# Patient Record
Sex: FEMALE | Race: WHITE | Employment: FULL TIME | URBAN - METROPOLITAN AREA
[De-identification: names, ages, dates, MRNs, and addresses within clinical notes are randomized per-mention and may not be internally consistent; named-entity substitution may affect disease eponyms.]

---

## 2022-03-05 ENCOUNTER — APPOINTMENT (OUTPATIENT)
Dept: GENERAL RADIOLOGY | Age: 57
DRG: 246 | End: 2022-03-05
Attending: STUDENT IN AN ORGANIZED HEALTH CARE EDUCATION/TRAINING PROGRAM
Payer: COMMERCIAL

## 2022-03-05 ENCOUNTER — HOSPITAL ENCOUNTER (INPATIENT)
Age: 57
LOS: 2 days | Discharge: HOME | DRG: 246 | End: 2022-03-07
Attending: STUDENT IN AN ORGANIZED HEALTH CARE EDUCATION/TRAINING PROGRAM | Admitting: STUDENT IN AN ORGANIZED HEALTH CARE EDUCATION/TRAINING PROGRAM
Payer: COMMERCIAL

## 2022-03-05 DIAGNOSIS — I21.3 ST ELEVATION (STEMI) MYOCARDIAL INFARCTION (HCC): ICD-10-CM

## 2022-03-05 DIAGNOSIS — I46.9 CARDIAC ARREST (HCC): ICD-10-CM

## 2022-03-05 DIAGNOSIS — E87.6 HYPOKALEMIA: ICD-10-CM

## 2022-03-05 DIAGNOSIS — I21.02 STEMI INVOLVING LEFT ANTERIOR DESCENDING CORONARY ARTERY (HCC): Primary | ICD-10-CM

## 2022-03-05 DIAGNOSIS — Z72.0 TOBACCO ABUSE: ICD-10-CM

## 2022-03-05 PROBLEM — E66.01 MORBID OBESITY (HCC): Status: ACTIVE | Noted: 2022-03-05

## 2022-03-05 PROBLEM — I47.20 VENTRICULAR TACHYCARDIA: Status: ACTIVE | Noted: 2022-03-05

## 2022-03-05 LAB
ALBUMIN SERPL-MCNC: 3.7 G/DL (ref 3.4–5)
ALBUMIN/GLOB SERPL: 1.1 {RATIO} (ref 0.8–1.7)
ALP SERPL-CCNC: 54 U/L (ref 45–117)
ALT SERPL-CCNC: 33 U/L (ref 13–56)
ANION GAP BLD CALC-SCNC: 14 MMOL/L (ref 10–20)
ANION GAP SERPL CALC-SCNC: 6 MMOL/L (ref 3–18)
APTT PPP: 27.5 SEC (ref 23–36.4)
APTT PPP: 38.7 SEC (ref 23–36.4)
AST SERPL-CCNC: 19 U/L (ref 10–38)
ATRIAL RATE: 64 BPM
BASE DEFICIT BLD-SCNC: 2.5 MMOL/L
BASOPHILS # BLD: 0.1 K/UL (ref 0–0.1)
BASOPHILS NFR BLD: 1 % (ref 0–2)
BILIRUB SERPL-MCNC: 0.4 MG/DL (ref 0.2–1)
BNP SERPL-MCNC: 105 PG/ML (ref 0–900)
BUN SERPL-MCNC: 19 MG/DL (ref 7–18)
BUN/CREAT SERPL: 17 (ref 12–20)
CA-I BLD-MCNC: 1.14 MMOL/L (ref 1.12–1.32)
CALCIUM SERPL-MCNC: 9.4 MG/DL (ref 8.5–10.1)
CALCULATED P AXIS, ECG09: 73 DEGREES
CALCULATED R AXIS, ECG10: 66 DEGREES
CALCULATED T AXIS, ECG11: 39 DEGREES
CHLORIDE BLD-SCNC: 107 MMOL/L (ref 98–107)
CHLORIDE SERPL-SCNC: 109 MMOL/L (ref 100–111)
CHOLEST SERPL-MCNC: 290 MG/DL
CO2 BLD-SCNC: 23 MMOL/L (ref 19–24)
CO2 SERPL-SCNC: 26 MMOL/L (ref 21–32)
CREAT BLD-MCNC: 1.01 MG/DL (ref 0.6–1.3)
CREAT SERPL-MCNC: 1.13 MG/DL (ref 0.6–1.3)
DIAGNOSIS, 93000: NORMAL
DIFFERENTIAL METHOD BLD: ABNORMAL
EOSINOPHIL # BLD: 0.4 K/UL (ref 0–0.4)
EOSINOPHIL NFR BLD: 3 % (ref 0–5)
ERYTHROCYTE [DISTWIDTH] IN BLOOD BY AUTOMATED COUNT: 13.1 % (ref 11.6–14.5)
GLOBULIN SER CALC-MCNC: 3.3 G/DL (ref 2–4)
GLUCOSE BLD-MCNC: 139 MG/DL (ref 65–100)
GLUCOSE SERPL-MCNC: 137 MG/DL (ref 74–99)
HCG SERPL QL: NEGATIVE
HCO3 BLD-SCNC: 22.2 MMOL/L (ref 22–26)
HCT VFR BLD AUTO: 44.8 % (ref 35–45)
HDLC SERPL-MCNC: 35 MG/DL (ref 40–60)
HDLC SERPL: 8.3 {RATIO} (ref 0–5)
HGB BLD-MCNC: 14.8 G/DL (ref 12–16)
IMM GRANULOCYTES # BLD AUTO: 0 K/UL (ref 0–0.04)
IMM GRANULOCYTES NFR BLD AUTO: 0 % (ref 0–0.5)
LACTATE BLD-SCNC: 3.86 MMOL/L (ref 0.4–2)
LDLC SERPL CALC-MCNC: 230.2 MG/DL (ref 0–100)
LIPID PROFILE,FLP: ABNORMAL
LYMPHOCYTES # BLD: 4.1 K/UL (ref 0.9–3.6)
LYMPHOCYTES NFR BLD: 33 % (ref 21–52)
MCH RBC QN AUTO: 32.2 PG (ref 24–34)
MCHC RBC AUTO-ENTMCNC: 33 G/DL (ref 31–37)
MCV RBC AUTO: 97.4 FL (ref 78–100)
MONOCYTES # BLD: 1 K/UL (ref 0.05–1.2)
MONOCYTES NFR BLD: 8 % (ref 3–10)
NEUTS SEG # BLD: 6.9 K/UL (ref 1.8–8)
NEUTS SEG NFR BLD: 55 % (ref 40–73)
NRBC # BLD: 0 K/UL (ref 0–0.01)
NRBC BLD-RTO: 0 PER 100 WBC
P-R INTERVAL, ECG05: 138 MS
PCO2 BLDV: 37.4 MMHG (ref 41–51)
PH BLDV: 7.38 [PH] (ref 7.32–7.42)
PLATELET # BLD AUTO: 316 K/UL (ref 135–420)
PMV BLD AUTO: 10 FL (ref 9.2–11.8)
PO2 BLDV: 26 MMHG (ref 25–40)
POTASSIUM BLD-SCNC: 3.3 MMOL/L (ref 3.5–5.1)
POTASSIUM SERPL-SCNC: 3.4 MMOL/L (ref 3.5–5.5)
PROT SERPL-MCNC: 7 G/DL (ref 6.4–8.2)
Q-T INTERVAL, ECG07: 424 MS
QRS DURATION, ECG06: 84 MS
QTC CALCULATION (BEZET), ECG08: 437 MS
RBC # BLD AUTO: 4.6 M/UL (ref 4.2–5.3)
SAO2 % BLD: 48 %
SERVICE CMNT-IMP: ABNORMAL
SODIUM BLD-SCNC: 143 MMOL/L (ref 136–145)
SODIUM SERPL-SCNC: 141 MMOL/L (ref 136–145)
SPECIMEN SITE: ABNORMAL
TRIGL SERPL-MCNC: 124 MG/DL (ref ?–150)
TROPONIN-HIGH SENSITIVITY: 16 NG/L (ref 0–54)
TROPONIN-HIGH SENSITIVITY: ABNORMAL NG/L (ref 0–54)
VENTRICULAR RATE, ECG03: 64 BPM
VLDLC SERPL CALC-MCNC: 24.8 MG/DL
WBC # BLD AUTO: 12.6 K/UL (ref 4.6–13.2)

## 2022-03-05 PROCEDURE — C1769 GUIDE WIRE: HCPCS | Performed by: INTERNAL MEDICINE

## 2022-03-05 PROCEDURE — 74011250637 HC RX REV CODE- 250/637: Performed by: INTERNAL MEDICINE

## 2022-03-05 PROCEDURE — 99223 1ST HOSP IP/OBS HIGH 75: CPT | Performed by: STUDENT IN AN ORGANIZED HEALTH CARE EDUCATION/TRAINING PROGRAM

## 2022-03-05 PROCEDURE — 99153 MOD SED SAME PHYS/QHP EA: CPT | Performed by: INTERNAL MEDICINE

## 2022-03-05 PROCEDURE — 74011250637 HC RX REV CODE- 250/637: Performed by: STUDENT IN AN ORGANIZED HEALTH CARE EDUCATION/TRAINING PROGRAM

## 2022-03-05 PROCEDURE — 94762 N-INVAS EAR/PLS OXIMTRY CONT: CPT

## 2022-03-05 PROCEDURE — 96374 THER/PROPH/DIAG INJ IV PUSH: CPT

## 2022-03-05 PROCEDURE — 027034Z DILATION OF CORONARY ARTERY, ONE ARTERY WITH DRUG-ELUTING INTRALUMINAL DEVICE, PERCUTANEOUS APPROACH: ICD-10-PCS | Performed by: INTERNAL MEDICINE

## 2022-03-05 PROCEDURE — 80061 LIPID PANEL: CPT

## 2022-03-05 PROCEDURE — 85730 THROMBOPLASTIN TIME PARTIAL: CPT

## 2022-03-05 PROCEDURE — 83880 ASSAY OF NATRIURETIC PEPTIDE: CPT

## 2022-03-05 PROCEDURE — 74011000250 HC RX REV CODE- 250: Performed by: INTERNAL MEDICINE

## 2022-03-05 PROCEDURE — 99223 1ST HOSP IP/OBS HIGH 75: CPT | Performed by: INTERNAL MEDICINE

## 2022-03-05 PROCEDURE — 99285 EMERGENCY DEPT VISIT HI MDM: CPT

## 2022-03-05 PROCEDURE — 93458 L HRT ARTERY/VENTRICLE ANGIO: CPT | Performed by: INTERNAL MEDICINE

## 2022-03-05 PROCEDURE — 82947 ASSAY GLUCOSE BLOOD QUANT: CPT

## 2022-03-05 PROCEDURE — 96375 TX/PRO/DX INJ NEW DRUG ADDON: CPT

## 2022-03-05 PROCEDURE — 85025 COMPLETE CBC W/AUTO DIFF WBC: CPT

## 2022-03-05 PROCEDURE — 80053 COMPREHEN METABOLIC PANEL: CPT

## 2022-03-05 PROCEDURE — 74011250636 HC RX REV CODE- 250/636: Performed by: STUDENT IN AN ORGANIZED HEALTH CARE EDUCATION/TRAINING PROGRAM

## 2022-03-05 PROCEDURE — 71045 X-RAY EXAM CHEST 1 VIEW: CPT

## 2022-03-05 PROCEDURE — C1887 CATHETER, GUIDING: HCPCS | Performed by: INTERNAL MEDICINE

## 2022-03-05 PROCEDURE — 93005 ELECTROCARDIOGRAM TRACING: CPT

## 2022-03-05 PROCEDURE — 65620000000 HC RM CCU GENERAL

## 2022-03-05 PROCEDURE — C1894 INTRO/SHEATH, NON-LASER: HCPCS | Performed by: INTERNAL MEDICINE

## 2022-03-05 PROCEDURE — C1874 STENT, COATED/COV W/DEL SYS: HCPCS | Performed by: INTERNAL MEDICINE

## 2022-03-05 PROCEDURE — 36415 COLL VENOUS BLD VENIPUNCTURE: CPT

## 2022-03-05 PROCEDURE — C1760 CLOSURE DEV, VASC: HCPCS | Performed by: INTERNAL MEDICINE

## 2022-03-05 PROCEDURE — 84703 CHORIONIC GONADOTROPIN ASSAY: CPT

## 2022-03-05 PROCEDURE — C1725 CATH, TRANSLUMIN NON-LASER: HCPCS | Performed by: INTERNAL MEDICINE

## 2022-03-05 PROCEDURE — 85347 COAGULATION TIME ACTIVATED: CPT

## 2022-03-05 PROCEDURE — 77030016699 HC CATH ANGI DX INFN1 CARD -A: Performed by: INTERNAL MEDICINE

## 2022-03-05 PROCEDURE — 77030013519 HC DEV INFL BASIX MRTM -B: Performed by: INTERNAL MEDICINE

## 2022-03-05 PROCEDURE — 99152 MOD SED SAME PHYS/QHP 5/>YRS: CPT | Performed by: INTERNAL MEDICINE

## 2022-03-05 PROCEDURE — 84484 ASSAY OF TROPONIN QUANT: CPT

## 2022-03-05 PROCEDURE — 77030013797 HC KT TRNSDUC PRSSR EDWD -A: Performed by: INTERNAL MEDICINE

## 2022-03-05 PROCEDURE — 74011250636 HC RX REV CODE- 250/636

## 2022-03-05 PROCEDURE — 4A023N7 MEASUREMENT OF CARDIAC SAMPLING AND PRESSURE, LEFT HEART, PERCUTANEOUS APPROACH: ICD-10-PCS | Performed by: INTERNAL MEDICINE

## 2022-03-05 PROCEDURE — 92941 PRQ TRLML REVSC TOT OCCL AMI: CPT | Performed by: INTERNAL MEDICINE

## 2022-03-05 PROCEDURE — 74011000250 HC RX REV CODE- 250: Performed by: STUDENT IN AN ORGANIZED HEALTH CARE EDUCATION/TRAINING PROGRAM

## 2022-03-05 PROCEDURE — B2151ZZ FLUOROSCOPY OF LEFT HEART USING LOW OSMOLAR CONTRAST: ICD-10-PCS | Performed by: INTERNAL MEDICINE

## 2022-03-05 PROCEDURE — 74011250636 HC RX REV CODE- 250/636: Performed by: INTERNAL MEDICINE

## 2022-03-05 DEVICE — XIENCE SIERRA™ EVEROLIMUS ELUTING CORONARY STENT SYSTEM 3.00 MM X 23 MM / RAPID-EXCHANGE
Type: IMPLANTABLE DEVICE | Status: FUNCTIONAL
Brand: XIENCE SIERRA™

## 2022-03-05 RX ORDER — HEPARIN SODIUM 1000 [USP'U]/ML
INJECTION, SOLUTION INTRAVENOUS; SUBCUTANEOUS AS NEEDED
Status: DISCONTINUED | OUTPATIENT
Start: 2022-03-05 | End: 2022-03-05 | Stop reason: HOSPADM

## 2022-03-05 RX ORDER — FENTANYL CITRATE 50 UG/ML
INJECTION, SOLUTION INTRAMUSCULAR; INTRAVENOUS AS NEEDED
Status: DISCONTINUED | OUTPATIENT
Start: 2022-03-05 | End: 2022-03-05 | Stop reason: HOSPADM

## 2022-03-05 RX ORDER — EPTIFIBATIDE 0.75 MG/ML
2 INJECTION, SOLUTION INTRAVENOUS CONTINUOUS
Status: ACTIVE | OUTPATIENT
Start: 2022-03-05 | End: 2022-03-05

## 2022-03-05 RX ORDER — LIDOCAINE HYDROCHLORIDE 10 MG/ML
10 INJECTION, SOLUTION EPIDURAL; INFILTRATION; INTRACAUDAL; PERINEURAL
Status: ACTIVE | OUTPATIENT
Start: 2022-03-05 | End: 2022-03-06

## 2022-03-05 RX ORDER — IBUPROFEN 200 MG
1 TABLET ORAL DAILY
Status: DISCONTINUED | OUTPATIENT
Start: 2022-03-05 | End: 2022-03-07 | Stop reason: HOSPADM

## 2022-03-05 RX ORDER — SODIUM CHLORIDE 0.9 % (FLUSH) 0.9 %
5-40 SYRINGE (ML) INJECTION EVERY 8 HOURS
Status: DISCONTINUED | OUTPATIENT
Start: 2022-03-05 | End: 2022-03-07 | Stop reason: HOSPADM

## 2022-03-05 RX ORDER — ONDANSETRON 2 MG/ML
4 INJECTION INTRAMUSCULAR; INTRAVENOUS
Status: ACTIVE | OUTPATIENT
Start: 2022-03-05 | End: 2022-03-06

## 2022-03-05 RX ORDER — HEPARIN SODIUM 1000 [USP'U]/ML
4000 INJECTION, SOLUTION INTRAVENOUS; SUBCUTANEOUS ONCE
Status: COMPLETED | OUTPATIENT
Start: 2022-03-05 | End: 2022-03-05

## 2022-03-05 RX ORDER — CALCIUM GLUCONATE 20 MG/ML
1 INJECTION, SOLUTION INTRAVENOUS
Status: DISCONTINUED | OUTPATIENT
Start: 2022-03-05 | End: 2022-03-05

## 2022-03-05 RX ORDER — MAGNESIUM SULFATE HEPTAHYDRATE 40 MG/ML
2 INJECTION, SOLUTION INTRAVENOUS ONCE
Status: DISCONTINUED | OUTPATIENT
Start: 2022-03-05 | End: 2022-03-05

## 2022-03-05 RX ORDER — HEPARIN SODIUM 5000 [USP'U]/ML
5000 INJECTION, SOLUTION INTRAVENOUS; SUBCUTANEOUS EVERY 8 HOURS
Status: DISCONTINUED | OUTPATIENT
Start: 2022-03-05 | End: 2022-03-05 | Stop reason: SDUPTHER

## 2022-03-05 RX ORDER — ATORVASTATIN CALCIUM 40 MG/1
80 TABLET, FILM COATED ORAL
Status: DISCONTINUED | OUTPATIENT
Start: 2022-03-05 | End: 2022-03-07 | Stop reason: HOSPADM

## 2022-03-05 RX ORDER — CARVEDILOL 3.12 MG/1
3.12 TABLET ORAL 2 TIMES DAILY WITH MEALS
Status: DISCONTINUED | OUTPATIENT
Start: 2022-03-05 | End: 2022-03-07 | Stop reason: HOSPADM

## 2022-03-05 RX ORDER — TEMAZEPAM 15 MG/1
15 CAPSULE ORAL
Status: ACTIVE | OUTPATIENT
Start: 2022-03-05 | End: 2022-03-06

## 2022-03-05 RX ORDER — LISINOPRIL 5 MG/1
5 TABLET ORAL DAILY
Status: DISCONTINUED | OUTPATIENT
Start: 2022-03-06 | End: 2022-03-07 | Stop reason: HOSPADM

## 2022-03-05 RX ORDER — EPTIFIBATIDE 0.75 MG/ML
INJECTION, SOLUTION INTRAVENOUS
Status: COMPLETED | OUTPATIENT
Start: 2022-03-05 | End: 2022-03-05

## 2022-03-05 RX ORDER — HEPARIN SODIUM 1000 [USP'U]/ML
1000 INJECTION, SOLUTION INTRAVENOUS; SUBCUTANEOUS ONCE
Status: COMPLETED | OUTPATIENT
Start: 2022-03-05 | End: 2022-03-05

## 2022-03-05 RX ORDER — SODIUM CHLORIDE 0.9 % (FLUSH) 0.9 %
5-40 SYRINGE (ML) INJECTION AS NEEDED
Status: DISCONTINUED | OUTPATIENT
Start: 2022-03-05 | End: 2022-03-07 | Stop reason: HOSPADM

## 2022-03-05 RX ORDER — NITROGLYCERIN 0.4 MG/1
0.4 TABLET SUBLINGUAL
Status: DISCONTINUED | OUTPATIENT
Start: 2022-03-05 | End: 2022-03-07 | Stop reason: HOSPADM

## 2022-03-05 RX ORDER — FENTANYL CITRATE 50 UG/ML
50 INJECTION, SOLUTION INTRAMUSCULAR; INTRAVENOUS
Status: COMPLETED | OUTPATIENT
Start: 2022-03-05 | End: 2022-03-05

## 2022-03-05 RX ORDER — SODIUM CHLORIDE 9 MG/ML
INJECTION, SOLUTION INTRAVENOUS
Status: COMPLETED | OUTPATIENT
Start: 2022-03-05 | End: 2022-03-05

## 2022-03-05 RX ORDER — POTASSIUM CHLORIDE 20 MEQ/1
40 TABLET, EXTENDED RELEASE ORAL EVERY 4 HOURS
Status: DISCONTINUED | OUTPATIENT
Start: 2022-03-05 | End: 2022-03-05 | Stop reason: CLARIF

## 2022-03-05 RX ORDER — ADHESIVE BANDAGE
30 BANDAGE TOPICAL DAILY PRN
Status: DISCONTINUED | OUTPATIENT
Start: 2022-03-05 | End: 2022-03-07 | Stop reason: HOSPADM

## 2022-03-05 RX ORDER — GUAIFENESIN 100 MG/5ML
81 LIQUID (ML) ORAL DAILY
Status: DISCONTINUED | OUTPATIENT
Start: 2022-03-06 | End: 2022-03-07 | Stop reason: HOSPADM

## 2022-03-05 RX ORDER — ONDANSETRON 2 MG/ML
INJECTION INTRAMUSCULAR; INTRAVENOUS AS NEEDED
Status: DISCONTINUED | OUTPATIENT
Start: 2022-03-05 | End: 2022-03-05 | Stop reason: HOSPADM

## 2022-03-05 RX ORDER — POTASSIUM CHLORIDE 7.45 MG/ML
10 INJECTION INTRAVENOUS
Status: DISCONTINUED | OUTPATIENT
Start: 2022-03-05 | End: 2022-03-05 | Stop reason: CLARIF

## 2022-03-05 RX ORDER — LIDOCAINE HYDROCHLORIDE 10 MG/ML
INJECTION, SOLUTION EPIDURAL; INFILTRATION; INTRACAUDAL; PERINEURAL AS NEEDED
Status: DISCONTINUED | OUTPATIENT
Start: 2022-03-05 | End: 2022-03-05 | Stop reason: HOSPADM

## 2022-03-05 RX ORDER — ATROPINE SULFATE 1 MG/ML
0.5 INJECTION, SOLUTION INTRAVENOUS AS NEEDED
Status: DISCONTINUED | OUTPATIENT
Start: 2022-03-05 | End: 2022-03-07 | Stop reason: HOSPADM

## 2022-03-05 RX ORDER — MIDAZOLAM HYDROCHLORIDE 1 MG/ML
INJECTION, SOLUTION INTRAMUSCULAR; INTRAVENOUS AS NEEDED
Status: DISCONTINUED | OUTPATIENT
Start: 2022-03-05 | End: 2022-03-05 | Stop reason: HOSPADM

## 2022-03-05 RX ORDER — EPTIFIBATIDE 2 MG/ML
INJECTION, SOLUTION INTRAVENOUS AS NEEDED
Status: DISCONTINUED | OUTPATIENT
Start: 2022-03-05 | End: 2022-03-05 | Stop reason: HOSPADM

## 2022-03-05 RX ORDER — HEPARIN SODIUM 1000 [USP'U]/ML
INJECTION, SOLUTION INTRAVENOUS; SUBCUTANEOUS
Status: COMPLETED
Start: 2022-03-05 | End: 2022-03-05

## 2022-03-05 RX ORDER — DOCUSATE SODIUM 100 MG/1
100 CAPSULE, LIQUID FILLED ORAL 2 TIMES DAILY
Status: DISCONTINUED | OUTPATIENT
Start: 2022-03-05 | End: 2022-03-07 | Stop reason: HOSPADM

## 2022-03-05 RX ORDER — ATROPINE SULFATE 1 MG/ML
0.6 INJECTION, SOLUTION INTRAVENOUS
Status: ACTIVE | OUTPATIENT
Start: 2022-03-05 | End: 2022-03-06

## 2022-03-05 RX ORDER — HEPARIN SODIUM 10000 [USP'U]/100ML
9-25 INJECTION, SOLUTION INTRAVENOUS
Status: DISCONTINUED | OUTPATIENT
Start: 2022-03-05 | End: 2022-03-05

## 2022-03-05 RX ORDER — NITROGLYCERIN 40 MG/100ML
INJECTION INTRAVENOUS
Status: COMPLETED | OUTPATIENT
Start: 2022-03-05 | End: 2022-03-05

## 2022-03-05 RX ORDER — POTASSIUM CHLORIDE 20 MEQ/1
40 TABLET, EXTENDED RELEASE ORAL EVERY 4 HOURS
Status: COMPLETED | OUTPATIENT
Start: 2022-03-05 | End: 2022-03-06

## 2022-03-05 RX ORDER — OXYCODONE AND ACETAMINOPHEN 5; 325 MG/1; MG/1
1-2 TABLET ORAL
Status: DISPENSED | OUTPATIENT
Start: 2022-03-05 | End: 2022-03-06

## 2022-03-05 RX ORDER — ACETAMINOPHEN 325 MG/1
650 TABLET ORAL
Status: DISCONTINUED | OUTPATIENT
Start: 2022-03-05 | End: 2022-03-07 | Stop reason: HOSPADM

## 2022-03-05 RX ORDER — SODIUM CHLORIDE 9 MG/ML
50 INJECTION, SOLUTION INTRAVENOUS CONTINUOUS
Status: DISPENSED | OUTPATIENT
Start: 2022-03-05 | End: 2022-03-06

## 2022-03-05 RX ADMIN — HEPARIN SODIUM 4000 UNITS: 1000 INJECTION INTRAVENOUS; SUBCUTANEOUS at 14:40

## 2022-03-05 RX ADMIN — SODIUM CHLORIDE, PRESERVATIVE FREE 10 ML: 5 INJECTION INTRAVENOUS at 15:41

## 2022-03-05 RX ADMIN — ACETAMINOPHEN 650 MG: 325 TABLET ORAL at 19:42

## 2022-03-05 RX ADMIN — POTASSIUM CHLORIDE 40 MEQ: 1500 TABLET, EXTENDED RELEASE ORAL at 20:18

## 2022-03-05 RX ADMIN — HEPARIN SODIUM 9 UNITS/KG/HR: 1000 INJECTION INTRAVENOUS; SUBCUTANEOUS at 15:02

## 2022-03-05 RX ADMIN — FENTANYL CITRATE 50 MCG: 50 INJECTION, SOLUTION INTRAMUSCULAR; INTRAVENOUS at 14:50

## 2022-03-05 RX ADMIN — ATORVASTATIN CALCIUM 80 MG: 40 TABLET, FILM COATED ORAL at 21:33

## 2022-03-05 RX ADMIN — OXYCODONE AND ACETAMINOPHEN 1 TABLET: 5; 325 TABLET ORAL at 21:36

## 2022-03-05 RX ADMIN — SODIUM CHLORIDE, PRESERVATIVE FREE 10 ML: 5 INJECTION INTRAVENOUS at 21:34

## 2022-03-05 RX ADMIN — SODIUM CHLORIDE 50 ML/HR: 9 INJECTION, SOLUTION INTRAVENOUS at 17:35

## 2022-03-05 RX ADMIN — EPTIFIBATIDE 2 MCG/KG/MIN: 0.75 INJECTION INTRAVENOUS at 16:56

## 2022-03-05 RX ADMIN — TICAGRELOR 180 MG: 90 TABLET ORAL at 14:55

## 2022-03-05 RX ADMIN — HEPARIN SODIUM 1000 UNITS: 1000 INJECTION INTRAVENOUS; SUBCUTANEOUS at 14:55

## 2022-03-05 RX ADMIN — HEPARIN SODIUM 4000 UNITS: 1000 INJECTION, SOLUTION INTRAVENOUS; SUBCUTANEOUS at 14:40

## 2022-03-05 NOTE — CONSULTS
Cardiology Consult Note    Consultation request by No admitting provider for patient encounter. for advice/opinion related to evaluating possible STEMI    Date of  Admission: 3/5/2022  2:38 PM   Primary Care Physician:  None       Assessment:        Active Hospital Problems    Diagnosis Date Noted    STEMI (ST elevation myocardial infarction) ([facility] [address] 03/05/2022     Priority: 1 - One    Ventricular tachycardia ([facility] [address] 03/05/2022    Cardiac arrest ([facility] [address] 03/05/2022    Tobacco abuse 03/05/2022    Morbid obesity ([facility] [address] 03/05/2022         1. Likely acute anterolateral MI   2. ongoing tobacco abuse  3. Hyperlipidemia  4. Morbid obesity       Primary cardiologist: None. She has not visited her old cardiologist in many years and does not take aspirin regularly. Plan:     . emergency cardiac catheterization and possible coronary intervention. Procedure discussed with patient risks including MI stroke death hematoma bleeding etc.  She is willing to proceed    Further plans depending on the findings of the catheterization. Labs are still pending but have been drawn. History of Present Illness: This is a 49 y.o. female admitted for No admission diagnoses are documented for this encounter. .    Patient complains of: Severe chest pain. She was raking leaves in her yard when she suddenly collapsed. Paramedics called. Initially, she had normal rhythm but soon she developed V. tach requiring cardioversion. Transported to emergency room. Aspirin given in route and heparin were given in the ER. I discussed with the ER attending Dr. Alyson Hicks. Initial EKG was diffuse J-point elevation and a repeat EKG showed worsening of J-point elevation consistent with possible acute ST elevation MI with hyperacute ST-T changes. She agreed for a cardiac catheterization after I described it in the ER. She denied any bleeding problems. No chronic liver lung or renal disease. No severe skin lesions or chronic headaches.   No seizure or stroke in the past.  No recent surgeries. Cardiac risk factors: smoking/ tobacco exposure, dyslipidemia, obesity, previous CAD and MI      Review of Symptoms:  Except as stated above include:  Constitutional:  negative  Respiratory:  negative  Cardiovascular:  negative other than what is above  Gastrointestinal: negative  Genitourinary:  negative  Musculoskeletal:  Negative  Neurological:  Negative  Dermatological:  Negative  Endocrinological: Negative  Psychological:  Negative         Past Medical History:   No past medical history on file. As above     Social History:     Social History     Socioeconomic History    Marital status:    Continues to smoke     Family History:   No family history on file. Medications:   No Known Allergies     Current Facility-Administered Medications   Medication Dose Route Frequency    heparin 25,000 units in  ml infusion  9-25 Units/kg/hr IntraVENous TITRATE     No current outpatient medications on file. Physical Exam:     Visit Vitals  BP 97/63   Pulse (!) 53   Resp 19   Ht 5' 4\" (1.626 m)   Wt 108.9 kg (240 lb)   SpO2 100%   BMI 41.20 kg/m²       TELE: normal sinus rhythm    BP Readings from Last 3 Encounters:   03/05/22 97/63     Pulse Readings from Last 3 Encounters:   03/05/22 (!) 53     Wt Readings from Last 3 Encounters:   03/05/22 108.9 kg (240 lb)       General:  alert, cooperative, severe distress, appears stated age, morbidly obese  Neck:  no carotid bruit, no JVD  Lungs:  clear to auscultation bilaterally  Heart:  regular rate and rhythm, S1, S2 normal, no murmur, click, rub or gallop  Abdomen:  abdomen is soft without significant tenderness, masses, organomegaly or guarding  Extremities:  extremities normal, atraumatic, no cyanosis or edema  Skin: Warm and dry.  no hyperpigmentation, vitiligo, or suspicious lesions  Neuro: alert, oriented x3, affect appropriate, no focal neurological deficits, moves all extremities well, no involuntary movements  Psych: non focal     Data Review:     Recent Labs     03/05/22  1435   WBC 12.6   HGB 14.8   HCT 44.8        Recent Labs     03/05/22  1435      K 3.4*      CO2 26   *   BUN 19*   CREA 1.13   CA 9.4   ALB 3.7   ALT 33       Results for orders placed or performed during the hospital encounter of 03/05/22   EKG, 12 LEAD, INITIAL   Result Value Ref Range    Ventricular Rate 64 BPM    Atrial Rate 64 BPM    P-R Interval 138 ms    QRS Duration 84 ms    Q-T Interval 424 ms    QTC Calculation (Bezet) 437 ms    Calculated P Axis 73 degrees    Calculated R Axis 66 degrees    Calculated T Axis 39 degrees    Diagnosis       Normal sinus rhythm  Low voltage QRS  Septal infarct , age undetermined  Possible Lateral infarct , age undetermined  Abnormal ECG  No previous ECGs available         All Cardiac Markers in the last 24 hours:  No results found for: CPK, CK, CKMMB, CKMB, RCK3, CKMBT, CKNDX, CKND1, ROLANDA, TROPT, TROIQ, ALLY, TROPT, TNIPOC, BNP, BNPP    Last Lipid:  No results found for: CHOL, CHOLX, CHLST, CHOLV, HDL, HDLP, LDL, LDLC, DLDLP, TGLX, TRIGL, TRIGP, CHHD, CHHDX    Cardiographics:     EKG Results     Procedure 720 Value Units Date/Time    EKG, 12 LEAD, INITIAL [[id number]] Collected: 03/05/22 1435    Order Status: Completed Updated: 03/05/22 1436     Ventricular Rate 64 BPM      Atrial Rate 64 BPM      P-R Interval 138 ms      QRS Duration 84 ms      Q-T Interval 424 ms      QTC Calculation (Bezet) 437 ms      Calculated P Axis 73 degrees      Calculated R Axis 66 degrees      Calculated T Axis 39 degrees      Diagnosis --     Normal sinus rhythm  Low voltage QRS  Septal infarct , age undetermined  Possible Lateral infarct , age undetermined  Abnormal ECG  No previous ECGs available                    XR Results (most recent):  No results found for this or any previous visit.         Signed By: Fritz Cushing, MD     March 5, 2022

## 2022-03-05 NOTE — ED NOTES
Pt taken to cath lab escorted by tech and RN. Report given to all staff in cath lab at time of drop off. All pt belongings including purse and ID given to .  place in cath lab waiting area.

## 2022-03-05 NOTE — H&P
History & Physical    Patient: Lou Roland MRN: [id number]  CSN: [id number]    YOB: 1965  Age: 49 y.o. Sex: female      DOA: 3/5/2022    Chief Complaint: No chief complaint on file. HPI:     Lou Roland is a 49 y.o. female w/ PMH CAD status post PCI with LAD stent placement, hypertension, hyperlipidemia, obesity who presented to the ED with complaints of chest pain initially. She had a V. Tach arrest for which she received CPR and defibrillation X1. According to EMS, patient may or may not have had a syncopal event and was found to be pulseless. She had ROSC and was alert and oriented x4 after this. Initially requiring higher amounts of O2 but now. She was apparently raking leaves prior to the onset of the chest pain and was having complaints of clamminess while doing this. In the ED, patient had ST elevations in the inferolateral leads. STEMI alert was called and she was taken emergently to the Cath Lab. She was loaded with aspirin and Brilinta at the time. Patient is status post in-stent thrombosis found with PCI in the same area. Currently, patient feels significantly better with resolution of her chest pain. States that she stopped taking her plavix and intermittently takes aspirin. Denies any n/v/d/c, dysuria, dizziness/lightheadedness, HA, changes in vision, focal motor weakness, rashes, joint pains.     Past Medical History:   Diagnosis Date    CAD in native artery     Hyperlipidemia     Hypertension     Obesity, Class III, BMI 40-49.9 (morbid obesity) (HCC)        Past Surgical History:   Procedure Laterality Date    HX CORONARY STENT PLACEMENT         Family History   Problem Relation Age of Onset    Esophageal Cancer Mother     Breast Cancer Father        Social History     Socioeconomic History    Marital status:        Prior to Admission medications    Not on File       No Known Allergies      Review of Systems  GENERAL: No weight loss, no falls. HEENT: No change in vision, no earache, no tinnitus, no sore throat or sinus congestion. NECK: No pain or stiffness. PULMONARY: No shortness of breath, no cough or wheeze. Cardiovascular: See HPI  GASTROINTESTINAL: No abdominal pain, no nausea, no vomiting or diarrhea, no melena or bright red blood per rectum. GENITOURINARY: No urinary frequency, no urgency, no hesitancy or dysuria. MUSCULOSKELETAL: No joint or muscle pain, no back pain, no recent trauma. DERMATOLOGIC: No rash, no itching, no lesions. ENDOCRINE: No polyuria, no polydipsia, no heat or cold intolerance. No recent change in weight. HEMATOLOGICAL: No anemia or easy bruising or bleeding. NEUROLOGIC: No headache, no seizures, no numbness, no tingling or weakness. Physical Exam:     Physical Exam:  Visit Vitals  BP 97/63   Pulse (!) 53   Resp 19   Ht 5' 4\" (1.626 m)   Wt 108.9 kg (240 lb) Comment: EST   SpO2 100%   BMI 41.20 kg/m²           No data recorded. No intake/output data recorded. No intake/output data recorded. General: AOX3, NAD. Obese. HEENT: NCAT, no scleral icterus, PERRL  Neck: No LAD, no JVD  Lungs: CTAB, no wheezing, rales, or crackles. In no respiratory distress. CV: RRR, S1/S2 normal. No MRG. Abdomen: Soft, NT, ND. Normoactive bowel sounds. Extremities: No cyanosis or edema. Skin: No rashes or lesions  Neuro: Aox3, no focal motor or sensory deficits    Labs Reviewed: All lab results for the last 24 hours reviewed.   CXR and EKG    Procedures/imaging: see electronic medical records for all procedures/Xrays and details which were not copied into this note but were reviewed prior to creation of Plan      Assessment/Plan     Active Problems:    STEMI (ST elevation myocardial infarction) ([facility] [address] (3/5/2022)       Problem:  · STEMI w in stent occlusion in LAD, s/p PCI  · Obesity  · HTN  · HLD  · Ongoing tobacco use    Plan:  · Admit to CVICU for post STEMI monitoring  · Start Brilinta and aspirin - was noncompliant with aspirin therapy. · starting low-dose Coreg given her previous bradycardia. Can titrate up as tolerated to goal of 25 twice daily  · Start nicotine patch - recommend discharging with Chantix  · Start lisinopril and atorvastatin 80  · Follow-up pregnancy test  · Follow-up HbA1c, TSH, and lipid panel  · Follow-up echo  · Follow further cardiology recs  · Orders placed for cardiac rehab  · Recommend weight loss. Nutrition consult. DVT/GI Prophylaxis: SCDs    Discussed with patient at bedside about hospital admission and my plan care, they understood and agree with my plan care. Moreno England MD  3/5/2022 4:12 PM    Disclaimer: Sections of this note are dictated using utilizing voice recognition software. Minor typographical errors may be present. If questions arise, please do not hesitate to contact me or call our department.

## 2022-03-05 NOTE — ED PROVIDER NOTES
EMERGENCY DEPARTMENT HISTORY AND PHYSICAL EXAM      Date: 3/5/2022  Patient Name: Anamika Clifford    History of Presenting Illness     No chief complaint on file. History (Context): Carleen Chiu is a 49 y.o. female with a past medical history significant for CAD status post LAD placement, hypertension, hyperlipidemia comes into the ED today due to cardiac arrest.  Per EMS patient initially called 911 for chest pain. Upon arrival patient may or may not have had a syncopal episode however while obtaining vitals patient went into V. tach and was found to be pulseless. Patient had CPR performed and defibrillation performed. Patient with return of spontaneous circulation following. Patient admits to chest pain upon presentation here to the emergency department. She states pain began approximately 1 hour prior to arrival here in the emergency department. She denies taking medication for treatment of her symptoms. Patient denied any alleviating or exacerbating factors. She did state prior to chest pain onset she was raking leaves and had associated clamminess. She otherwise denies any abdominal pain, nausea, or vomiting. Patient describes her symptoms as severe in quality.       PCP: None    Current Facility-Administered Medications   Medication Dose Route Frequency Provider Last Rate Last Admin    heparin 25,000 units in  ml infusion  9-25 Units/kg/hr IntraVENous TITRATE Genevieve Norris DO   Stopped at 03/05/22 1532    midazolam (VERSED) injection    PRN Micah Cantor MD   1 mg at 03/05/22 1523    fentaNYL citrate (PF) injection    PRN Micah Cantor MD   25 mcg at 03/05/22 1528    lidocaine (PF) (XYLOCAINE) 10 mg/mL (1 %) injection    PRN Micah Cantor MD   10 mL at 03/05/22 1524    heparin (porcine) 1,000 unit/mL injection    PRIVY Cantor MD   4,000 Units at 03/05/22 1532    nitroglycerin (TRIDIL) 400 mcg/ml infusion    CONTINUOUS Micah Cantor MD 1.5 mL/hr at 03/05/22 1536 10 mcg/min at 03/05/22 1536       Past History     Past Medical History:   CAD, HTN, HLD    Past Surgical History:  No past surgical history on file. Family History:  No family history on file. Social History:   Social History     Tobacco Use    Smoking status: Not on file    Smokeless tobacco: Not on file   Substance Use Topics    Alcohol use: Not on file    Drug use: Not on file     Unknown due to acuity    Allergies:  No Known Allergies    PMH, PSH, family history, social history, allergies reviewed with the patient with significant items noted above. Review of Systems   Review of Systems   Constitutional: Negative for chills, diaphoresis and fever. Clammy     HENT: Negative for sore throat. Eyes: Negative for visual disturbance. Respiratory: Negative for shortness of breath. Cardiovascular: Positive for chest pain. Gastrointestinal: Negative for abdominal pain, nausea and vomiting. Genitourinary: Negative for difficulty urinating. Musculoskeletal: Negative for myalgias. Skin: Positive for pallor. Negative for rash. Neurological: Positive for syncope. Negative for headaches. Physical Exam     Vitals:    03/05/22 1443 03/05/22 1453 03/05/22 1458 03/05/22 1503   BP: 108/72 (!) 103/56 97/63    Pulse: 64 (!) 57 (!) 59 (!) 53   Resp: 19 15 18 19   SpO2: 99% 100% 100% 100%   Weight:       Height:           Physical Exam  Vitals and nursing note reviewed. Constitutional:       General: She is in acute distress. Appearance: Normal appearance. She is not ill-appearing or toxic-appearing. HENT:      Head: Normocephalic and atraumatic. Mouth/Throat:      Mouth: Mucous membranes are moist.   Eyes:      General: No scleral icterus. Conjunctiva/sclera: Conjunctivae normal.   Cardiovascular:      Rate and Rhythm: Normal rate and regular rhythm. Pulses: Normal pulses.       Comments: Normal peripheral perfusion  Pulmonary:      Effort: Pulmonary effort is normal. No respiratory distress. Abdominal:      General: There is no distension. Palpations: Abdomen is soft. Tenderness: There is no abdominal tenderness. Musculoskeletal:         General: No deformity. Normal range of motion. Cervical back: Normal range of motion and neck supple. Skin:     General: Skin is warm and dry. Findings: No rash. Neurological:      General: No focal deficit present. Mental Status: She is alert and oriented to person, place, and time. Mental status is at baseline. Psychiatric:         Mood and Affect: Mood normal.         Thought Content: Thought content normal.         Diagnostic Study Results     Labs -     Recent Results (from the past 12 hour(s))   CBC WITH AUTOMATED DIFF    Collection Time: 03/05/22  2:35 PM   Result Value Ref Range    WBC 12.6 4.6 - 13.2 K/uL    RBC 4.60 4.20 - 5.30 M/uL    HGB 14.8 12.0 - 16.0 g/dL    HCT 44.8 35.0 - 45.0 %    MCV 97.4 78.0 - 100.0 FL    MCH 32.2 24.0 - 34.0 PG    MCHC 33.0 31.0 - 37.0 g/dL    RDW 13.1 11.6 - 14.5 %    PLATELET 448 031 - 918 K/uL    MPV 10.0 9.2 - 11.8 FL    NRBC 0.0 0  WBC    ABSOLUTE NRBC 0.00 0.00 - 0.01 K/uL    NEUTROPHILS 55 40 - 73 %    LYMPHOCYTES 33 21 - 52 %    MONOCYTES 8 3 - 10 %    EOSINOPHILS 3 0 - 5 %    BASOPHILS 1 0 - 2 %    IMMATURE GRANULOCYTES 0 0.0 - 0.5 %    ABS. NEUTROPHILS 6.9 1.8 - 8.0 K/UL    ABS. LYMPHOCYTES 4.1 (H) 0.9 - 3.6 K/UL    ABS. MONOCYTES 1.0 0.05 - 1.2 K/UL    ABS. EOSINOPHILS 0.4 0.0 - 0.4 K/UL    ABS. BASOPHILS 0.1 0.0 - 0.1 K/UL    ABS. IMM.  GRANS. 0.0 0.00 - 0.04 K/UL    DF AUTOMATED     METABOLIC PANEL, COMPREHENSIVE    Collection Time: 03/05/22  2:35 PM   Result Value Ref Range    Sodium 141 136 - 145 mmol/L    Potassium 3.4 (L) 3.5 - 5.5 mmol/L    Chloride 109 100 - 111 mmol/L    CO2 26 21 - 32 mmol/L    Anion gap 6 3.0 - 18 mmol/L    Glucose 137 (H) 74 - 99 mg/dL    BUN 19 (H) 7.0 - 18 MG/DL    Creatinine 1.13 0.6 - 1.3 MG/DL BUN/Creatinine ratio 17 12 - 20      GFR est AA >60 >60 ml/min/1.73m2    GFR est non-AA 50 (L) >60 ml/min/1.73m2    Calcium 9.4 8.5 - 10.1 MG/DL    Bilirubin, total 0.4 0.2 - 1.0 MG/DL    ALT (SGPT) 33 13 - 56 U/L    AST (SGOT) 19 10 - 38 U/L    Alk.  phosphatase 54 45 - 117 U/L    Protein, total 7.0 6.4 - 8.2 g/dL    Albumin 3.7 3.4 - 5.0 g/dL    Globulin 3.3 2.0 - 4.0 g/dL    A-G Ratio 1.1 0.8 - 1.7     NT-PRO BNP    Collection Time: 03/05/22  2:35 PM   Result Value Ref Range    NT pro- 0 - 900 PG/ML   PTT    Collection Time: 03/05/22  2:35 PM   Result Value Ref Range    aPTT 27.5 23.0 - 36.4 SEC   EKG, 12 LEAD, INITIAL    Collection Time: 03/05/22  2:35 PM   Result Value Ref Range    Ventricular Rate 64 BPM    Atrial Rate 64 BPM    P-R Interval 138 ms    QRS Duration 84 ms    Q-T Interval 424 ms    QTC Calculation (Bezet) 437 ms    Calculated P Axis 73 degrees    Calculated R Axis 66 degrees    Calculated T Axis 39 degrees    Diagnosis       Normal sinus rhythm  Low voltage QRS  Septal infarct , age undetermined  Possible Lateral infarct , age undetermined  Abnormal ECG  No previous ECGs available     BLOOD GAS,CHEM8,LACTIC ACID POC    Collection Time: 03/05/22  2:39 PM   Result Value Ref Range    Calcium, ionized (POC) 1.14 1.12 - 1.32 mmol/L    Base deficit (POC) 2.5 mmol/L    HCO3 (POC) 22.2 22 - 26 MMOL/L    CO2, POC 23 19 - 24 MMOL/L    O2 SAT 48 %    Sample source VENOUS BLOOD      Performed by Nasrin Khan     Sodium (POC) 143 136 - 145 mmol/L    Potassium (POC) 3.3 (L) 3.5 - 5.1 mmol/L    Glucose (POC) 139 (H) 65 - 100 mg/dL    Creatinine (POC) 1.01 0.6 - 1.3 mg/dL    Lactic Acid (POC) 3.86 (HH) 0.40 - 2.00 mmol/L    Chloride (POC) 107 98 - 107 mmol/L    Anion gap, POC 14 10 - 20      GFRAA, POC >60 >60 ml/min/1.73m2    GFRNA, POC 57 (L) >60 ml/min/1.73m2    pH, venous (POC) 7.38 7.32 - 7.42      pCO2, venous (POC) 37.4 (L) 41 - 51 MMHG    pO2, venous (POC) 26 25 - 40 mmHg      Labs Reviewed   CBC WITH AUTOMATED DIFF - Abnormal; Notable for the following components:       Result Value    ABS. LYMPHOCYTES 4.1 (*)     All other components within normal limits   METABOLIC PANEL, COMPREHENSIVE - Abnormal; Notable for the following components:    Potassium 3.4 (*)     Glucose 137 (*)     BUN 19 (*)     GFR est non-AA 50 (*)     All other components within normal limits   BLOOD GAS,CHEM8,LACTIC ACID POC - Abnormal; Notable for the following components:    Potassium (POC) 3.3 (*)     Glucose (POC) 139 (*)     Lactic Acid (POC) 3.86 (*)     GFRNA, POC 57 (*)     pCO2, venous (POC) 37.4 (*)     All other components within normal limits   TROPONIN-HIGH SENSITIVITY   NT-PRO BNP   PTT   PTT       Radiologic Studies -   XR CHEST PORT    (Results Pending)     CT Results  (Last 48 hours)    None        CXR Results  (Last 48 hours)    None          The laboratory results, imaging results, and other diagnostic exams were reviewed in the EMR. Medical Decision Making   I am the first provider for this patient. I reviewed the vital signs, available nursing notes, past medical history, past surgical history, family history and social history. Vital Signs-Reviewed the patient's vital signs. ED EKG interpretation:  Rhythm: normal sinus rhythm; and regular . Rate (approx.): 64; Axis: normal; P wave: normal; QRS interval: normal ; ST/T wave: elevated ; Other findings: ischemia . This EKG was interpreted by Sherryle Donalds, D.O. Records Reviewed: Personally, on initial evaluation    MDM:   Marcos Valle presents with complaint of cardiac arrest  DDX includes but is not limited to: MI, ACS, PE    Patient overall in acute distress however vital signs grossly within normal limits. Initial EKG shows J-point elevation however more consistent with early repolarization than STEMI. However due to patient's significant history as well as cardiac arrest concern for MI at this time despite no overt ST elevations.   Will provide patient with heparin while here in the emergency department. Patient received aspirin prior to arrival.  Will obtain lab work and imaging for further evaluation of patients complaint. Will continue to monitor and evaluate patient while in the ED. Orders as below:  Orders Placed This Encounter    XR CHEST PORT    CBC WITH AUTOMATED DIFF    METABOLIC PANEL, COMPREHENSIVE    TROPONIN-HIGH SENSITIVITY    NT-PRO BNP    PTT - BASELINE PRIOR TO INITIATION OF HEPARIN INFUSION    PTT    PTT    CBC W/ AUTOMATED DIFF--DAILY WHILE ON HEPARIN    NOTIFY PROVIDER: LAB VALUES CHANGES    NOTIFY PROVIDER: SPECIFY If any sign of bleeding and/or hematoma, STOP heparin. Notify physician STAT and do STAT CBC. Hold heparin until notified by physician. ONE TIME Routine    NOTIFY PROVIDER: LAB VALUES CHANGES    BLOOD GAS,CHEM8,LACTIC ACID POC    EKG, 12 LEAD, INITIAL    heparin (porcine) 1,000 unit/mL injection 4,000 Units    heparin (porcine) 1,000 unit/mL injection    DISCONTD: heparin 25,000 units in D5W 250 ml infusion    fentaNYL citrate (PF) injection 50 mcg    ticagrelor (BRILINTA) tablet 180 mg    heparin (porcine) 1,000 unit/mL injection 1,000 Units    heparin 25,000 units in  ml infusion    midazolam (VERSED) injection    fentaNYL citrate (PF) injection    lidocaine (PF) (XYLOCAINE) 10 mg/mL (1 %) injection    heparin (porcine) 1,000 unit/mL injection    nitroglycerin (TRIDIL) 400 mcg/ml infusion        ED Course:   ED Course as of 03/05/22 1539   Sat Mar 05, 2022   1455 Spoke with cardiology who recommended providing patient with an additional 1000 units of heparin as well as Brilinta for further treatment. Will take patient to Cath Lab as soon as they arrive. We will continue to monitor patient.  [DV]      ED Course User Index  [DV] Zev Steele DO           Procedures:  Procedures        Diagnosis and Disposition     CLINICAL IMPRESSION:  1. ST elevation (STEMI) myocardial [facility])      There are no discharge medications for this patient. Disposition: Admit    Patient condition at time of disposition: Stable      Critical Care Time:   The services I provided to this patient were to treat and/or prevent clinically significant deterioration that could result in the failure of one or more body systems and/or organ systems due to Acute coronary syndrome with active chest pain. Services included the following:  -reviewing nursing notes and old charts  -vital sign assessments  -direct patient care  -medication orders and management  -interpreting and reviewing diagnostic studies/labs  -re-evaluations  -documentation time    Aggregate critical care time was 32 minutes, which includes only time during which I was engaged in work directly related to the patient's care as described above, whether I was at bedside or elsewhere in the Emergency Department. It did not include time spent performing other reported procedures or the services of residents, students, nurses, or advance practice providers. Eboni Burrows D.O.    3:39 PM          Dragon Disclaimer     Please note that this dictation was completed with Garen[employer], the computer voice recognition software. Quite often unanticipated grammatical, syntax, homophones, and other interpretive errors are inadvertently transcribed by the computer software. Please disregard these errors. Please excuse any errors that have escaped final proofreading. Eboni FALLON

## 2022-03-05 NOTE — PROGRESS NOTES
Emergency cardiac cath and PCI of the LAD done in the midsegment. Significant ventricular ectopy post reperfusion requiring 1 dose of IV amiodarone. IV Integrilin given for intracoronary thrombus-small burden. The drip was ordered for the next 6 hours. Hoping that Brilinta will be fully effective by then.

## 2022-03-05 NOTE — Clinical Note
TRANSFER - OUT REPORT:     Verbal report given to: Delmer John RN. Report consisted of patient's Situation, Background, Assessment and   Recommendations(SBAR). Opportunity for questions and clarification was provided. Patient transported with a Cardiac Cath Tech / Patient Care Tech and Monitor. Patient transported to: [facility] ICU, 2353.

## 2022-03-05 NOTE — ED TRIAGE NOTES
Pt brought in via medic. Call out for CP. Pt became unresponsive. CPR needed and 1 shock ( V-tach). Pt was given 1 nitro SL, 324 ASA. Pt on NRB 15 L on arrival. Answering questions A&Ox4. Obvious ST elevation on monitor.  HX: Stent Prox LAD 2007

## 2022-03-05 NOTE — Clinical Note
TRANSFER - IN REPORT:     Verbal report received from: [facility]. Report consisted of patient's Situation, Background, Assessment and   Recommendations(SBAR). Opportunity for questions and clarification was provided. Assessment completed upon patient's arrival to unit and care assumed. Patient transported with a Registered Nurse and Monitor.

## 2022-03-05 NOTE — Clinical Note
Contrast Dose Calculator:   Patient's age: 49.   Patient's sex: Female. Patient weight (kg) = 108.9. Creatinine level (mg/dL) = 1.13. Creatinine clearance (mL/min): 98.   Max Contrast dose per Creatinine Cl calculator = 220.5 mL.

## 2022-03-06 ENCOUNTER — APPOINTMENT (OUTPATIENT)
Dept: NON INVASIVE DIAGNOSTICS | Age: 57
DRG: 246 | End: 2022-03-06
Attending: STUDENT IN AN ORGANIZED HEALTH CARE EDUCATION/TRAINING PROGRAM
Payer: COMMERCIAL

## 2022-03-06 LAB
ANION GAP SERPL CALC-SCNC: 5 MMOL/L (ref 3–18)
APTT PPP: 28.8 SEC (ref 23–36.4)
APTT PPP: 29.7 SEC (ref 23–36.4)
ATRIAL RATE: 60 BPM
ATRIAL RATE: 74 BPM
BASOPHILS # BLD: 0 K/UL (ref 0–0.1)
BASOPHILS NFR BLD: 0 % (ref 0–2)
BUN SERPL-MCNC: 21 MG/DL (ref 7–18)
BUN/CREAT SERPL: 29 (ref 12–20)
CALCIUM SERPL-MCNC: 8.5 MG/DL (ref 8.5–10.1)
CALCULATED P AXIS, ECG09: 74 DEGREES
CALCULATED P AXIS, ECG09: 76 DEGREES
CALCULATED R AXIS, ECG10: 69 DEGREES
CALCULATED R AXIS, ECG10: 83 DEGREES
CALCULATED T AXIS, ECG11: 46 DEGREES
CALCULATED T AXIS, ECG11: 49 DEGREES
CHLORIDE SERPL-SCNC: 110 MMOL/L (ref 100–111)
CHOLEST SERPL-MCNC: 264 MG/DL
CO2 SERPL-SCNC: 23 MMOL/L (ref 21–32)
CREAT SERPL-MCNC: 0.72 MG/DL (ref 0.6–1.3)
DIAGNOSIS, 93000: NORMAL
DIAGNOSIS, 93000: NORMAL
DIFFERENTIAL METHOD BLD: ABNORMAL
ECHO AO ROOT DIAM: 2.9 CM
ECHO AO ROOT INDEX: 1.44 CM/M2
ECHO EST RA PRESSURE: 8 MMHG
ECHO LA VOL 2C: 54 ML (ref 22–52)
ECHO LA VOL 4C: 49 ML (ref 22–52)
ECHO LA VOLUME AREA LENGTH: 56 ML
ECHO LA VOLUME INDEX A2C: 27 ML/M2 (ref 16–34)
ECHO LA VOLUME INDEX A4C: 24 ML/M2 (ref 16–34)
ECHO LA VOLUME INDEX AREA LENGTH: 28 ML/M2 (ref 16–34)
ECHO LV E' LATERAL VELOCITY: 7 CM/S
ECHO LV E' SEPTAL VELOCITY: 7 CM/S
ECHO LV FRACTIONAL SHORTENING: 25 % (ref 28–44)
ECHO LV INTERNAL DIMENSION DIASTOLE INDEX: 1.79 CM/M2
ECHO LV INTERNAL DIMENSION DIASTOLIC: 3.6 CM (ref 3.9–5.3)
ECHO LV INTERNAL DIMENSION SYSTOLIC INDEX: 1.34 CM/M2
ECHO LV INTERNAL DIMENSION SYSTOLIC: 2.7 CM
ECHO LV IVSD: 1.2 CM (ref 0.6–0.9)
ECHO LV MASS 2D: 141.5 G (ref 67–162)
ECHO LV MASS INDEX 2D: 70.4 G/M2 (ref 43–95)
ECHO LV POSTERIOR WALL DIASTOLIC: 1.2 CM (ref 0.6–0.9)
ECHO LV RELATIVE WALL THICKNESS RATIO: 0.67
ECHO LVOT AREA: 2.8 CM2
ECHO LVOT DIAM: 1.9 CM
ECHO LVOT MEAN GRADIENT: 4 MMHG
ECHO LVOT PEAK GRADIENT: 8 MMHG
ECHO LVOT PEAK VELOCITY: 1.4 M/S
ECHO LVOT STROKE VOLUME INDEX: 33.4 ML/M2
ECHO LVOT SV: 67.2 ML
ECHO LVOT VTI: 23.7 CM
ECHO MV A VELOCITY: 1 M/S
ECHO MV E DECELERATION TIME (DT): 228.6 MS
ECHO MV E VELOCITY: 0.94 M/S
ECHO MV E/A RATIO: 0.94
ECHO MV E/E' LATERAL: 13.43
ECHO MV E/E' RATIO (AVERAGED): 13.43
ECHO MV E/E' SEPTAL: 13.43
ECHO PULMONARY ARTERY SYSTOLIC PRESSURE (PASP): 22 MMHG
ECHO RIGHT VENTRICULAR SYSTOLIC PRESSURE (RVSP): 22 MMHG
ECHO RV FREE WALL PEAK S': 14 CM/S
ECHO RV TAPSE: 2 CM (ref 1.5–2)
ECHO TV REGURGITANT MAX VELOCITY: 1.9 M/S
ECHO TV REGURGITANT PEAK GRADIENT: 14 MMHG
EOSINOPHIL # BLD: 0 K/UL (ref 0–0.4)
EOSINOPHIL NFR BLD: 0 % (ref 0–5)
ERYTHROCYTE [DISTWIDTH] IN BLOOD BY AUTOMATED COUNT: 13.2 % (ref 11.6–14.5)
GLUCOSE SERPL-MCNC: 136 MG/DL (ref 74–99)
HCT VFR BLD AUTO: 40.8 % (ref 35–45)
HDLC SERPL-MCNC: 33 MG/DL (ref 40–60)
HDLC SERPL: 8 {RATIO} (ref 0–5)
HGB BLD-MCNC: 13.1 G/DL (ref 12–16)
IMM GRANULOCYTES # BLD AUTO: 0 K/UL (ref 0–0.04)
IMM GRANULOCYTES NFR BLD AUTO: 0 % (ref 0–0.5)
LDLC SERPL CALC-MCNC: 183.8 MG/DL (ref 0–100)
LIPID PROFILE,FLP: ABNORMAL
LYMPHOCYTES # BLD: 1 K/UL (ref 0.9–3.6)
LYMPHOCYTES NFR BLD: 10 % (ref 21–52)
MAGNESIUM SERPL-MCNC: 1.9 MG/DL (ref 1.6–2.6)
MCH RBC QN AUTO: 31.9 PG (ref 24–34)
MCHC RBC AUTO-ENTMCNC: 32.1 G/DL (ref 31–37)
MCV RBC AUTO: 99.3 FL (ref 78–100)
MONOCYTES # BLD: 0.5 K/UL (ref 0.05–1.2)
MONOCYTES NFR BLD: 4 % (ref 3–10)
NEUTS SEG # BLD: 9 K/UL (ref 1.8–8)
NEUTS SEG NFR BLD: 85 % (ref 40–73)
NRBC # BLD: 0 K/UL (ref 0–0.01)
NRBC BLD-RTO: 0 PER 100 WBC
P-R INTERVAL, ECG05: 136 MS
P-R INTERVAL, ECG05: 138 MS
PLATELET # BLD AUTO: 246 K/UL (ref 135–420)
PMV BLD AUTO: 10.5 FL (ref 9.2–11.8)
POTASSIUM SERPL-SCNC: 4.9 MMOL/L (ref 3.5–5.5)
Q-T INTERVAL, ECG07: 386 MS
Q-T INTERVAL, ECG07: 428 MS
QRS DURATION, ECG06: 78 MS
QRS DURATION, ECG06: 86 MS
QTC CALCULATION (BEZET), ECG08: 428 MS
QTC CALCULATION (BEZET), ECG08: 428 MS
RBC # BLD AUTO: 4.11 M/UL (ref 4.2–5.3)
SODIUM SERPL-SCNC: 138 MMOL/L (ref 136–145)
TRIGL SERPL-MCNC: 236 MG/DL (ref ?–150)
TROPONIN-HIGH SENSITIVITY: ABNORMAL NG/L (ref 0–54)
TSH SERPL DL<=0.05 MIU/L-ACNC: 0.71 UIU/ML (ref 0.36–3.74)
VENTRICULAR RATE, ECG03: 60 BPM
VENTRICULAR RATE, ECG03: 74 BPM
VLDLC SERPL CALC-MCNC: 47.2 MG/DL
WBC # BLD AUTO: 10.6 K/UL (ref 4.6–13.2)

## 2022-03-06 PROCEDURE — 83735 ASSAY OF MAGNESIUM: CPT

## 2022-03-06 PROCEDURE — 74011250637 HC RX REV CODE- 250/637: Performed by: STUDENT IN AN ORGANIZED HEALTH CARE EDUCATION/TRAINING PROGRAM

## 2022-03-06 PROCEDURE — 65660000004 HC RM CVT STEPDOWN

## 2022-03-06 PROCEDURE — 80048 BASIC METABOLIC PNL TOTAL CA: CPT

## 2022-03-06 PROCEDURE — 93306 TTE W/DOPPLER COMPLETE: CPT

## 2022-03-06 PROCEDURE — 99233 SBSQ HOSP IP/OBS HIGH 50: CPT | Performed by: STUDENT IN AN ORGANIZED HEALTH CARE EDUCATION/TRAINING PROGRAM

## 2022-03-06 PROCEDURE — 84443 ASSAY THYROID STIM HORMONE: CPT

## 2022-03-06 PROCEDURE — 99233 SBSQ HOSP IP/OBS HIGH 50: CPT | Performed by: INTERNAL MEDICINE

## 2022-03-06 PROCEDURE — 74011000250 HC RX REV CODE- 250: Performed by: INTERNAL MEDICINE

## 2022-03-06 PROCEDURE — 84484 ASSAY OF TROPONIN QUANT: CPT

## 2022-03-06 PROCEDURE — 36415 COLL VENOUS BLD VENIPUNCTURE: CPT

## 2022-03-06 PROCEDURE — 85025 COMPLETE CBC W/AUTO DIFF WBC: CPT

## 2022-03-06 PROCEDURE — 85730 THROMBOPLASTIN TIME PARTIAL: CPT

## 2022-03-06 PROCEDURE — 93005 ELECTROCARDIOGRAM TRACING: CPT

## 2022-03-06 PROCEDURE — 80061 LIPID PANEL: CPT

## 2022-03-06 PROCEDURE — 74011250637 HC RX REV CODE- 250/637: Performed by: INTERNAL MEDICINE

## 2022-03-06 PROCEDURE — 74011000250 HC RX REV CODE- 250: Performed by: STUDENT IN AN ORGANIZED HEALTH CARE EDUCATION/TRAINING PROGRAM

## 2022-03-06 PROCEDURE — 93306 TTE W/DOPPLER COMPLETE: CPT | Performed by: INTERNAL MEDICINE

## 2022-03-06 PROCEDURE — 74011250636 HC RX REV CODE- 250/636: Performed by: INTERNAL MEDICINE

## 2022-03-06 RX ORDER — MAGNESIUM SULFATE 1 G/100ML
1 INJECTION INTRAVENOUS ONCE
Status: COMPLETED | OUTPATIENT
Start: 2022-03-06 | End: 2022-03-06

## 2022-03-06 RX ADMIN — OXYCODONE AND ACETAMINOPHEN 2 TABLET: 5; 325 TABLET ORAL at 04:59

## 2022-03-06 RX ADMIN — DOCUSATE SODIUM 100 MG: 100 CAPSULE, LIQUID FILLED ORAL at 17:07

## 2022-03-06 RX ADMIN — SODIUM CHLORIDE, PRESERVATIVE FREE 10 ML: 5 INJECTION INTRAVENOUS at 21:30

## 2022-03-06 RX ADMIN — ATORVASTATIN CALCIUM 80 MG: 40 TABLET, FILM COATED ORAL at 21:27

## 2022-03-06 RX ADMIN — POTASSIUM CHLORIDE 40 MEQ: 1500 TABLET, EXTENDED RELEASE ORAL at 00:12

## 2022-03-06 RX ADMIN — DOCUSATE SODIUM 100 MG: 100 CAPSULE, LIQUID FILLED ORAL at 08:34

## 2022-03-06 RX ADMIN — MAGNESIUM SULFATE HEPTAHYDRATE 1 G: 1 INJECTION, SOLUTION INTRAVENOUS at 06:25

## 2022-03-06 RX ADMIN — SODIUM CHLORIDE, PRESERVATIVE FREE 10 ML: 5 INJECTION INTRAVENOUS at 15:33

## 2022-03-06 RX ADMIN — ASPIRIN 81 MG 81 MG: 81 TABLET ORAL at 08:34

## 2022-03-06 RX ADMIN — LISINOPRIL 5 MG: 10 TABLET ORAL at 08:34

## 2022-03-06 RX ADMIN — ACETAMINOPHEN 650 MG: 325 TABLET ORAL at 17:09

## 2022-03-06 RX ADMIN — TICAGRELOR 90 MG: 90 TABLET ORAL at 17:07

## 2022-03-06 RX ADMIN — SODIUM CHLORIDE, PRESERVATIVE FREE 10 ML: 5 INJECTION INTRAVENOUS at 05:01

## 2022-03-06 RX ADMIN — CARVEDILOL 3.12 MG: 3.12 TABLET, FILM COATED ORAL at 17:07

## 2022-03-06 RX ADMIN — TICAGRELOR 90 MG: 90 TABLET ORAL at 04:59

## 2022-03-06 RX ADMIN — SODIUM CHLORIDE, PRESERVATIVE FREE 10 ML: 5 INJECTION INTRAVENOUS at 15:32

## 2022-03-06 NOTE — ROUTINE PROCESS
1400 TRANSFER - IN REPORT:    Verbal report received from Sajan RN(name) on [address]  being received from [facility] ICU(unit) for routine progression of care      Report consisted of patients Situation, Background, Assessment and   Recommendations(SBAR). Information from the following report(s) SBAR, MAR, Recent Results and Cardiac Rhythm NSR was reviewed with the receiving nurse. Opportunity for questions and clarification was provided. Assessment completed upon patients arrival to unit and care assumed. Patient denies any pain at this time. Right groin dressing dry and intact. Peripheral pulses palpable. Call bell and telephone placed within reach.

## 2022-03-06 NOTE — PROGRESS NOTES
Problem: Cath Lab Procedures: Pre-Procedure  Goal: Off Pathway (Use only if patient is Off Pathway)  Outcome: Progressing Towards Goal  Goal: Activity/Safety  Outcome: Progressing Towards Goal  Goal: Consults, if ordered  Outcome: Progressing Towards Goal  Goal: Diagnostic Test/Procedures  Outcome: Progressing Towards Goal  Goal: Nutrition/Diet  Outcome: Progressing Towards Goal  Goal: Discharge Planning  Outcome: Progressing Towards Goal  Goal: Medications  Outcome: Progressing Towards Goal  Goal: Respiratory  Outcome: Progressing Towards Goal  Goal: Treatments/Interventions/Procedures  Outcome: Progressing Towards Goal  Goal: Psychosocial  Outcome: Progressing Towards Goal  Goal: *Verbalize description of procedure  Outcome: Progressing Towards Goal  Goal: *Consent signed  Outcome: Progressing Towards Goal     Problem: Cath Lab Procedures: Pre-Procedure  Goal: Off Pathway (Use only if patient is Off Pathway)  3/6/2022 0220 by Aries Matos RN  Outcome: Progressing Towards Goal  3/6/2022 0220 by Aries Matos RN  Outcome: Progressing Towards Goal  Goal: Activity/Safety  3/6/2022 0220 by Aries Matos RN  Outcome: Progressing Towards Goal  3/6/2022 0220 by Aries Matos RN  Outcome: Progressing Towards Goal  Goal: Consults, if ordered  3/6/2022 0220 by Aries Matos RN  Outcome: Progressing Towards Goal  3/6/2022 0220 by Aries Matos RN  Outcome: Progressing Towards Goal  Goal: Diagnostic Test/Procedures  3/6/2022 0220 by Aries Matos RN  Outcome: Progressing Towards Goal  3/6/2022 0220 by Aries Matos RN  Outcome: Progressing Towards Goal  Goal: Nutrition/Diet  3/6/2022 0220 by Aries Matos RN  Outcome: Progressing Towards Goal  3/6/2022 0220 by Aries Matos RN  Outcome: Progressing Towards Goal  Goal: Discharge Planning  3/6/2022 0220 by Aries Matos RN  Outcome: Progressing Towards Goal  3/6/2022 0220 by Aries Matos RN  Outcome: Progressing Towards Goal  Goal: Medications  3/6/2022 0220 by Laila Pimentel, RN  Outcome: Progressing Towards Goal  3/6/2022 0220 by Laila Pimentel, RN  Outcome: Progressing Towards Goal  Goal: Respiratory  3/6/2022 0220 by Laila Pimentel, RN  Outcome: Progressing Towards Goal  3/6/2022 0220 by Laila Pimentel, RN  Outcome: Progressing Towards Goal  Goal: Treatments/Interventions/Procedures  3/6/2022 0220 by Laila Pimentel, RN  Outcome: Progressing Towards Goal  3/6/2022 0220 by Laila Pimentel, RN  Outcome: Progressing Towards Goal  Goal: Psychosocial  3/6/2022 0220 by Laila Pimentel, RN  Outcome: Progressing Towards Goal  3/6/2022 0220 by Laila Pimentel, RN  Outcome: Progressing Towards Goal  Goal: *Verbalize description of procedure  3/6/2022 0220 by Laila Pimentel, RN  Outcome: Progressing Towards Goal  3/6/2022 0220 by Laila Pimentel RN  Outcome: Progressing Towards Goal  Goal: *Consent signed  3/6/2022 0220 by Laila Pimentel, RN  Outcome: Progressing Towards Goal  3/6/2022 0220 by Laila Pimentel RN  Outcome: Progressing Towards Goal

## 2022-03-06 NOTE — PROGRESS NOTES
conducted an initial consultation and Spiritual Assessment for [employer], who is a 49 y.o.,female. Patient's Primary Language is: [place]. According to the patient's EMR Druze Affiliation is: Non Taoist.     The reason the Patient came to the hospital is:   Patient Active Problem List    Diagnosis Date Noted    STEMI (ST elevation myocardial infarction) ([facility] 03/05/2022    Ventricular tachycardia ([facility] 03/05/2022    Cardiac arrest ([facility] 03/05/2022    Tobacco abuse 03/05/2022    Morbid obesity ([facility] 03/05/2022        The  provided the following Interventions:  Initiated a relationship of care and support through brief supportive dialogue. Explored issues of medardo, belief, spirituality and Temple/ritual needs while hospitalized. Provided information about Spiritual Care Services and the availability of chaplains for spiritual support. Chart reviewed. The following outcomes where achieved:  Patient expressed how she was feeling about current hospitalization. Patient expressed gratitude for 's visit. Assessment:  Patient does not have any Temple/cultural needs that will affect patient's preferences in health care. There are no spiritual or Temple issues which require intervention at this time. Plan:  Chaplains will continue to follow and will provide pastoral care on an as needed/requested basis.  recommends bedside caregivers page  on duty if patient shows signs of acute spiritual or emotional distress.     [address] [place]   [phone]

## 2022-03-06 NOTE — PROGRESS NOTES
Cardiology Progress Note    Admit Date: 3/5/2022  Attending Cardiologist: Dr. Madisyn Newman:     Hospital Problems  Date Reviewed: 3/5/2022          Codes Class Noted POA    * (Principal) STEMI (ST elevation myocardial infarction) ([facility] ICD-10-CM: I21.3  ICD-9-CM: 410.90  3/5/2022 Yes        Ventricular tachycardia ([facility] ICD-10-CM: I47.2  ICD-9-CM: 427.1  3/5/2022 Yes        Cardiac arrest ([facility] ICD-10-CM: I46.9  ICD-9-CM: 427.5  3/5/2022 Yes        Tobacco abuse ICD-10-CM: Z72.0  ICD-9-CM: 305.1  3/5/2022 Yes        Morbid obesity ([facility] ICD-10-CM: E66.01  ICD-9-CM: 278.01  3/5/2022 Yes                  Plan:     · Stable hemodynamics post PCI post STEMI. · BP is soft. Continue present medications for now. · Transfer to stepdown and ambulate. · Discussed with the patient regarding tobacco cessation and lifestyle modification. We will follow closely. Echo is pending. Subjective:     No new complaints. Chest pain has mostly resolved other than muscular discomfort from compressions.     Objective:      Patient Vitals for the past 8 hrs:   Temp Pulse Resp BP SpO2   03/06/22 1021 -- -- -- 92/77 --   03/06/22 0900 -- 69 18 92/77 --   03/06/22 0800 98.1 °F (36.7 °C) 89 14 (!) 119/94 96 %   03/06/22 0700 -- 65 16 113/68 96 %   03/06/22 0600 -- 79 20 132/71 97 %   03/06/22 0500 -- 78 14 133/77 98 %   03/06/22 0400 98.2 °F (36.8 °C) 66 21 119/79 97 %   03/06/22 0300 -- 68 16 126/72 98 %         Patient Vitals for the past 96 hrs:   Weight   03/06/22 1021 96.2 kg (212 lb)   03/06/22 0600 96.4 kg (212 lb 9.6 oz)   03/05/22 1442 108.9 kg (240 lb)   03/05/22 1441 108.9 kg (240 lb)       TELE: normal sinus rhythm               Current Facility-Administered Medications   Medication Dose Route Frequency Last Admin    heparin 25,000 units in  ml infusion  9-25 Units/kg/hr IntraVENous TITRATE Stopped at 03/05/22 1532    sodium chloride (NS) flush 5-40 mL  5-40 mL IntraVENous Q8H 10 mL at 03/06/22 2969    sodium chloride (NS) flush 5-40 mL  5-40 mL IntraVENous PRN      magnesium hydroxide (MILK OF MAGNESIA) 400 mg/5 mL oral suspension 30 mL  30 mL Oral DAILY PRN      docusate sodium (COLACE) capsule 100 mg  100 mg Oral  mg at 03/06/22 0834    carvediloL (COREG) tablet 3.125 mg  3.125 mg Oral BID WITH MEALS      aspirin chewable tablet 81 mg  81 mg Oral DAILY 81 mg at 03/06/22 0834    lisinopriL (PRINIVIL, ZESTRIL) tablet 5 mg  5 mg Oral DAILY 5 mg at 03/06/22 0834    atorvastatin (LIPITOR) tablet 80 mg  80 mg Oral QHS 80 mg at 03/05/22 2133    ticagrelor (BRILINTA) tablet 90 mg  90 mg Oral Q12H 90 mg at 03/06/22 0459    sodium chloride (NS) flush 5-40 mL  5-40 mL IntraVENous Q8H 10 mL at 03/06/22 0501    sodium chloride (NS) flush 5-40 mL  5-40 mL IntraVENous PRN      oxyCODONE-acetaminophen (PERCOCET) 5-325 mg per tablet 1-2 Tablet  1-2 Tablet Oral Q4H PRN 2 Tablet at 03/06/22 0459    acetaminophen (TYLENOL) tablet 650 mg  650 mg Oral Q4H  mg at 03/05/22 1942    temazepam (RESTORIL) capsule 15 mg  15 mg Oral QHS PRN      ondansetron (ZOFRAN) injection 4 mg  4 mg IntraVENous Q4H PRN      lidocaine (PF) (XYLOCAINE) 10 mg/mL (1 %) injection 10 mL  10 mL SubCUTAneous ONCE PRN      atropine 1 mg/mL injection 0.6 mg  0.6 mg IntraVENous ONCE PRN      nitroglycerin (NITROSTAT) tablet 0.4 mg  0.4 mg SubLINGual Q5MIN PRN      atropine 1 mg/mL injection 0.5 mg  0.5 mg IntraVENous PRN      nicotine (NICODERM CQ) 21 mg/24 hr patch 1 Patch  1 Patch TransDERmal DAILY 1 Patch at 03/06/22 0900    ELECTROLYTE REPLACEMENT PROTOCOL - Potassium   1 Each Other PRN      ELECTROLYTE REPLACEMENT PROTOCOL - Magnesium  1 Each Other PRN      ELECTROLYTE REPLACEMENT PROTOCOL - Phosphorus  1 Each Other PRN      ELECTROLYTE REPLACEMENT PROTOCOL - Calcium  1 Each Other PRN           Intake/Output Summary (Last 24 hours) at 3/6/2022 1036  Last data filed at 3/6/2022 0800  Gross per 24 hour   Intake 881.49 ml Output 300 ml   Net 581.49 ml       Physical Exam:  General:  alert, cooperative, no distress, appears stated age, morbidly obese  Neck:  no carotid bruit, no JVD  Lungs:  clear to auscultation bilaterally  Heart:  regular rate and rhythm, S1, S2 normal, no murmur, click, rub or gallop  Abdomen:  abdomen is soft without significant tenderness, masses, organomegaly or guarding  Extremities:  extremities normal, atraumatic, no cyanosis or edema  No groin hematoma or bruits.   Distal pulses are present    Visit Vitals  BP 92/77   Pulse 69   Temp 98.1 °F (36.7 °C)   Resp 18   Ht 5' 4\" (1.626 m)   Wt 96.2 kg (212 lb)   SpO2 96%   BMI 36.39 kg/m²       Data Review:     Labs: Results:       Chemistry Recent Labs     03/06/22 0229 03/05/22  1435   * 137*    141   K 4.9 3.4*    109   CO2 23 26   BUN 21* 19*   CREA 0.72 1.13   CA 8.5 9.4   MG 1.9  --    AGAP 5 6   BUCR 29* 17   AP  --  54   TP  --  7.0   ALB  --  3.7   GLOB  --  3.3   AGRAT  --  1.1      CBC w/Diff Recent Labs     03/06/22 0229 03/05/22  1435   WBC 10.6 12.6   RBC 4.11* 4.60   HGB 13.1 14.8   HCT 40.8 44.8    316   GRANS 85* 55   LYMPH 10* 33   EOS 0 3      Cardiac Enzymes No results found for: CPK, CK, CKMMB, CKMB, RCK3, CKMBT, CKNDX, CKND1, ROLANDA, TROPT, TROIQ, ALLY, TROPT, TNIPOC, BNP, BNPP   Coagulation Recent Labs     03/06/22  0904 03/06/22 0229   APTT 28.8 29.7       Lipid Panel Lab Results   Component Value Date/Time    Cholesterol, total 264 (H) 03/06/2022 02:29 AM    HDL Cholesterol 33 (L) 03/06/2022 02:29 AM    LDL, calculated 183.8 (H) 03/06/2022 02:29 AM    VLDL, calculated 47.2 03/06/2022 02:29 AM    Triglyceride 236 (H) 03/06/2022 02:29 AM    CHOL/HDL Ratio 8.0 (H) 03/06/2022 02:29 AM      BNP No results found for: BNP, BNPP, XBNPT   Liver Enzymes Recent Labs     03/05/22  1435   TP 7.0   ALB 3.7   AP 54      Thyroid Studies Lab Results   Component Value Date/Time    TSH 0.71 03/06/2022 02:29 AM          Signed By: Sunny Tobin MD     March 6, 2022

## 2022-03-07 VITALS
DIASTOLIC BLOOD PRESSURE: 66 MMHG | BODY MASS INDEX: 36.19 KG/M2 | OXYGEN SATURATION: 96 % | WEIGHT: 212 LBS | HEART RATE: 83 BPM | TEMPERATURE: 99 F | SYSTOLIC BLOOD PRESSURE: 108 MMHG | HEIGHT: 64 IN | RESPIRATION RATE: 21 BRPM

## 2022-03-07 LAB
ANION GAP SERPL CALC-SCNC: 2 MMOL/L (ref 3–18)
BASOPHILS # BLD: 0 K/UL (ref 0–0.1)
BASOPHILS NFR BLD: 0 % (ref 0–2)
BUN SERPL-MCNC: 17 MG/DL (ref 7–18)
BUN/CREAT SERPL: 20 (ref 12–20)
CALCIUM SERPL-MCNC: 9.2 MG/DL (ref 8.5–10.1)
CHLORIDE SERPL-SCNC: 111 MMOL/L (ref 100–111)
CO2 SERPL-SCNC: 27 MMOL/L (ref 21–32)
CREAT SERPL-MCNC: 0.83 MG/DL (ref 0.6–1.3)
DIFFERENTIAL METHOD BLD: ABNORMAL
EOSINOPHIL # BLD: 0.2 K/UL (ref 0–0.4)
EOSINOPHIL NFR BLD: 2 % (ref 0–5)
ERYTHROCYTE [DISTWIDTH] IN BLOOD BY AUTOMATED COUNT: 13.2 % (ref 11.6–14.5)
GLUCOSE SERPL-MCNC: 90 MG/DL (ref 74–99)
HCT VFR BLD AUTO: 40.5 % (ref 35–45)
HGB BLD-MCNC: 12.8 G/DL (ref 12–16)
IMM GRANULOCYTES # BLD AUTO: 0 K/UL (ref 0–0.04)
IMM GRANULOCYTES NFR BLD AUTO: 0 % (ref 0–0.5)
LYMPHOCYTES # BLD: 2.1 K/UL (ref 0.9–3.6)
LYMPHOCYTES NFR BLD: 21 % (ref 21–52)
MCH RBC QN AUTO: 31.3 PG (ref 24–34)
MCHC RBC AUTO-ENTMCNC: 31.6 G/DL (ref 31–37)
MCV RBC AUTO: 99 FL (ref 78–100)
MONOCYTES # BLD: 0.9 K/UL (ref 0.05–1.2)
MONOCYTES NFR BLD: 9 % (ref 3–10)
NEUTS SEG # BLD: 6.8 K/UL (ref 1.8–8)
NEUTS SEG NFR BLD: 68 % (ref 40–73)
NRBC # BLD: 0 K/UL (ref 0–0.01)
NRBC BLD-RTO: 0 PER 100 WBC
PLATELET # BLD AUTO: 216 K/UL (ref 135–420)
PMV BLD AUTO: 10.5 FL (ref 9.2–11.8)
POTASSIUM SERPL-SCNC: 4.5 MMOL/L (ref 3.5–5.5)
RBC # BLD AUTO: 4.09 M/UL (ref 4.2–5.3)
SODIUM SERPL-SCNC: 140 MMOL/L (ref 136–145)
WBC # BLD AUTO: 10 K/UL (ref 4.6–13.2)

## 2022-03-07 PROCEDURE — 74011250637 HC RX REV CODE- 250/637: Performed by: INTERNAL MEDICINE

## 2022-03-07 PROCEDURE — 85025 COMPLETE CBC W/AUTO DIFF WBC: CPT

## 2022-03-07 PROCEDURE — 74011250637 HC RX REV CODE- 250/637: Performed by: STUDENT IN AN ORGANIZED HEALTH CARE EDUCATION/TRAINING PROGRAM

## 2022-03-07 PROCEDURE — 74011000250 HC RX REV CODE- 250: Performed by: INTERNAL MEDICINE

## 2022-03-07 PROCEDURE — 80048 BASIC METABOLIC PNL TOTAL CA: CPT

## 2022-03-07 PROCEDURE — 99232 SBSQ HOSP IP/OBS MODERATE 35: CPT | Performed by: INTERNAL MEDICINE

## 2022-03-07 PROCEDURE — 99239 HOSP IP/OBS DSCHRG MGMT >30: CPT | Performed by: STUDENT IN AN ORGANIZED HEALTH CARE EDUCATION/TRAINING PROGRAM

## 2022-03-07 PROCEDURE — 36415 COLL VENOUS BLD VENIPUNCTURE: CPT

## 2022-03-07 PROCEDURE — 74011000250 HC RX REV CODE- 250: Performed by: STUDENT IN AN ORGANIZED HEALTH CARE EDUCATION/TRAINING PROGRAM

## 2022-03-07 RX ORDER — LISINOPRIL 5 MG/1
5 TABLET ORAL DAILY
Qty: 30 TABLET | Refills: 1 | Status: SHIPPED | OUTPATIENT
Start: 2022-03-08 | End: 2022-03-31 | Stop reason: SDUPTHER

## 2022-03-07 RX ORDER — ATORVASTATIN CALCIUM 80 MG/1
80 TABLET, FILM COATED ORAL
Qty: 30 TABLET | Refills: 1 | Status: SHIPPED | OUTPATIENT
Start: 2022-03-07 | End: 2022-03-31 | Stop reason: SDUPTHER

## 2022-03-07 RX ORDER — GUAIFENESIN 100 MG/5ML
81 LIQUID (ML) ORAL DAILY
Qty: 30 TABLET | Refills: 1 | Status: SHIPPED | OUTPATIENT
Start: 2022-03-08 | End: 2022-03-31 | Stop reason: SDUPTHER

## 2022-03-07 RX ORDER — CARVEDILOL 3.12 MG/1
3.12 TABLET ORAL 2 TIMES DAILY WITH MEALS
Qty: 60 TABLET | Refills: 1 | Status: SHIPPED | OUTPATIENT
Start: 2022-03-07 | End: 2022-03-31 | Stop reason: SDUPTHER

## 2022-03-07 RX ORDER — IBUPROFEN 200 MG
1 TABLET ORAL DAILY
Qty: 30 PATCH | Refills: 0 | Status: SHIPPED
Start: 2022-03-08 | End: 2022-03-31 | Stop reason: DRUGHIGH

## 2022-03-07 RX ADMIN — ASPIRIN 81 MG 81 MG: 81 TABLET ORAL at 08:31

## 2022-03-07 RX ADMIN — SODIUM CHLORIDE, PRESERVATIVE FREE 10 ML: 5 INJECTION INTRAVENOUS at 05:20

## 2022-03-07 RX ADMIN — DOCUSATE SODIUM 100 MG: 100 CAPSULE, LIQUID FILLED ORAL at 08:31

## 2022-03-07 RX ADMIN — CARVEDILOL 3.12 MG: 3.12 TABLET, FILM COATED ORAL at 16:22

## 2022-03-07 RX ADMIN — TICAGRELOR 90 MG: 90 TABLET ORAL at 15:51

## 2022-03-07 RX ADMIN — CARVEDILOL 3.12 MG: 3.12 TABLET, FILM COATED ORAL at 08:31

## 2022-03-07 RX ADMIN — TICAGRELOR 90 MG: 90 TABLET ORAL at 05:20

## 2022-03-07 NOTE — ROUTINE PROCESS
0730 Bedside and Verbal shift change report given to [employer] (oncoming nurse). Report included the following information SBAR, Intake/Output, MAR, Recent Results and Cardiac Rhythm NSR.

## 2022-03-07 NOTE — PROGRESS NOTES
Reason for Admission:  STEMI (ST elevation myocardial infarction) ([facility] [I21.3]                 RUR Score:    10           Plan for utilizing home health:    No                       Likelihood of Readmission:   LOW                         Transition of Care Plan:              Initial assessment completed with patient and her  at her bedside. Cognitive status of patient: oriented to time, place, person and situation. Face sheet information confirmed:  yes. The patient designates her   to participate in her discharge plan and to receive any needed information. This patient lives in a home with . Patient is able to navigate steps as needed. Prior to hospitalization, patient was considered to be independent with ADLs/IADLS : yes . Patient has a current ACP document on file: no      Healthcare Decision Maker:     Click here to complete BuildingLayer including selection of the Healthcare Decision Maker Relationship (ie \"Primary\")    The  will be available to transport patient home upon discharge. The patient already has none reported medical equipment available in the home. Patient is not currently active with home health. Patient has not stayed in a skilled nursing facility or rehab. Was  stay within last 60 days : no. This patient is on dialysis :no     Currently, the discharge plan is Home. The patient states that she can obtain her medications from the pharmacy, and take her medications as directed. Patient's current insurance is [employer] does not have a pcp and will need one when discharging home. Care Management Interventions  PCP Verified by CM: No  Mode of Transport at Discharge:  Other (see comment) (family)  Transition of Care Consult (CM Consult): Discharge Planning  Support Systems: Spouse/Significant Other  Confirm Follow Up Transport: Family  Discharge Location  Patient Expects to be Discharged to[de-identified] Home with outpatient services        ASHLYN DelgadoN RN  Care Management  Pager: [phone]

## 2022-03-07 NOTE — PROGRESS NOTES
Pt discharge orders noted. Discharge education done and AVS signed by pt. Being picked up by caregiver and discharged home to self care.

## 2022-03-07 NOTE — CONSULTS
Nutrition Education    · Verbally reviewed information with Patient and Family member  · Educated on [employer] Diet     · Written educational materials provided. · Contact name and number provided. · Refer to Patient Education activity for more details. Pt receptive to diet education. Pt received heart healthy diet education in the past and was already knowledgeable. Endorses doing well with her diet and education and then changed jobs, which modified her routine and had not been doing as well with diet compliance for the last 6 months. Pt motivated to reinstate diet changes moving forward. Pt currently only on a diabetic diet with no h/o diabetes; no heart healthy diet restrictions in place. Will modify diet order to comply with recommended diet.      Electronically signed by Anne-Marie Alvares RD on 3/7/2022 at 11:42 AM    Contact Number: [phone]

## 2022-03-07 NOTE — DISCHARGE SUMMARY
Discharge Summary    Patient: Pauly Andrews MRN: [id number]  CSN: [id number]    YOB: 1965  Age: 49 y.o. Sex: female    DOA: 3/5/2022 LOS:  LOS: 2 days   Discharge Date:      Admission Diagnosis: STEMI (ST elevation myocardial infarction) ([facility] [I21.3]    Discharge Diagnosis:    Hospital Problems  Date Reviewed: 3/5/2022          Codes Class Noted POA    * (Principal) STEMI (ST elevation myocardial infarction) ([facility] ICD-10-CM: I21.3  ICD-9-CM: 410.90  3/5/2022 Yes        Ventricular tachycardia ([facility] ICD-10-CM: I47.2  ICD-9-CM: 427.1  3/5/2022 Yes        Cardiac arrest ([facility] ICD-10-CM: I46.9  ICD-9-CM: 427.5  3/5/2022 Yes        Tobacco abuse ICD-10-CM: Z72.0  ICD-9-CM: 305.1  3/5/2022 Yes        Morbid obesity ([facility] ICD-10-CM: E66.01  ICD-9-CM: 278.01  3/5/2022 Yes              Discharge Condition: Stable    PHYSICAL EXAM  Visit Vitals  /66 (BP 1 Location: Right upper arm, BP Patient Position: Semi fowlers)   Pulse 83   Temp 99 °F (37.2 °C)   Resp 21   Ht 5' 4\" (1.626 m)   Wt 96.2 kg (212 lb)   SpO2 96%   BMI 36.39 kg/m²       General: Alert, cooperative, no acute distress    HEENT: NC, Atraumatic. PERRLA, EOMI. Anicteric sclerae. Lungs:  CTA Bilaterally. No Wheezing/Rhonchi/Rales. Heart:  Regular  rhythm,  No murmur, No Rubs, No Gallops  Abdomen: Soft, Non distended, Non tender. +Bowel sounds, no HSM  Extremities: No c/c/e  Psych:   Good insight. Not anxious or agitated. Neurologic:  CN 2-12 grossly intact, oriented X 3. No acute neurological deficits    Hospital Course By Problem:   1. STEMI  2. Ventricular tachycardia  3. Cardiac Arrest   #1-3. Pauly Andrews is a 49 y.o. female w/ PMH CAD status post PCI with LAD stent placement, hypertension, hyperlipidemia, obesity who presented to the ED with complaints of chest pain initially. She had a V. Tach arrest for which she received CPR and defibrillation X1.   According to EMS, patient may or may not have had a syncopal event and was found to be pulseless. She had ROSC and was alert and oriented x4 after this. In the ED, patient had ST elevations in the inferolateral leads. STEMI alert was called and she was taken emergently to the Cath Lab. Cardiac cath and PCI of LAD completed by Dr. Mamie Hernandez. Medical management was optimized as listed in discharge meds. Patient without chest pain, SOB, or any discomfort following cath or up to discharge.      4. Morbid obesity - Nutrition consulted. Patient given information on heart healthy diet. 5. Tobacco Abuse - Counseled on tobacco cessation. Discharged on nicotine patches     Consults:   Cardiology - Dr. Mayank Lechuga, Dr. Mamie Hernandez     Significant Diagnostic Studies:   Cardiac catheterization 3.5.22:  · Acute ST elevation MI from totally occluded previously deployed mid LAD stent. · 50% mid circumflex lesion and diffuse luminal irregularities throughout coronary vasculature. · Successful PCI of the mid LAD deploying a drug-eluting stent without any complications. Stenting done within the previously deployed stent but involves a longer segment today. · Severely elevated LVEDP of 29 mmHg. · Severely reduced ejection fraction in the range of 30 to 35% by visual estimate and wall motion abnormalities as described. · Significant ventricular ectopy including ventricular bigeminy seen as the artery opened up requiring 1 dose of IV amiodarone. · IV Integrilin given for intracoronary thrombus-small burden. Heparin infusion. Echo (3/6/22)   Left Ventricle Left ventricle size is normal. Mildly increased wall thickness. See diagram for wall motion findings. Moderately reduced left ventricular systolic function with a visually estimated EF of 35 - 40%. Grade II diastolic dysfunction with increased LAP. Left Atrium Left atrium size is normal. LA Vol Index A/L is 28 mL/m2. Right Ventricle Not well visualized. Right ventricle size is normal. Normal systolic function. TAPSE is 2.0 cm.    Right Atrium Right atrium size is normal.   Aortic Valve Valve structure is normal. No transvalvular regurgitation. No stenosis. Mitral Valve Valve structure is normal. Trace transvalvular regurgitation. No stenosis noted. Tricuspid Valve Valve structure is normal. Trace transvalvular regurgitation. No stenosis noted. Pulmonic Valve Valve structure is normal. No transvalvular regurgitation. No stenosis noted. Pulmonary Artery Pulmonary hypertension not present. Aorta Normal sized sinus of Valsalva. IVC/Hepatic Veins IVC diameter is less than or equal to 21 mm and decreases less than 50% during inspiration; therefore the estimated right atrial pressure is intermediate (~8 mmHg). Pericardium No pericardial effusion. Discharge Medications:     Current Discharge Medication List      START taking these medications    Details   aspirin 81 mg chewable tablet Take 1 Tablet by mouth daily. Qty: 30 Tablet, Refills: 1      atorvastatin (LIPITOR) 80 mg tablet Take 1 Tablet by mouth nightly. Qty: 30 Tablet, Refills: 1      carvediloL (COREG) 3.125 mg tablet Take 1 Tablet by mouth two (2) times daily (with meals). Qty: 60 Tablet, Refills: 1      lisinopriL (PRINIVIL, ZESTRIL) 5 mg tablet Take 1 Tablet by mouth daily. Qty: 30 Tablet, Refills: 1      nicotine (NICODERM CQ) 21 mg/24 hr 1 Patch by TransDERmal route daily for 30 days. Qty: 30 Patch, Refills: 0      ticagrelor (BRILINTA) 90 mg tablet Take 1 Tablet by mouth every twelve (12) hours every twelve (12) hours.   Qty: 60 Tablet, Refills: 1             Activity: activity as tolerated    Diet: Cardiac Diet    Wound Care: None needed    Follow-up: with PCP, None in 7-10days    Minutes spent on discharge: >30 minutes spent coordinating this discharge (review instructions/follow-up, prescriptions, preparing report for sign off)

## 2022-03-07 NOTE — PROGRESS NOTES
Progress Note  Hospitalist Service    Patient: Mckenzie Bautista MRN: [id number]   SSN: [id number]  YOB: 1965   Age: 49 y.o. Sex: female      Admit Date: 3/5/2022    LOS: 1 day   No chief complaint on file. Subjective:     Patient comfortable today. No chest pain, no SOB. Objective:     Vitals:  Visit Vitals  /72   Pulse 73   Temp 98.7 °F (37.1 °C)   Resp 16   Ht 5' 4\" (1.626 m)   Wt 96.2 kg (212 lb)   SpO2 97%   BMI 36.39 kg/m²       Physical Exam:   General: AOX3, NAD. Obese. HEENT: NCAT, no scleral icterus, PERRL  Neck: No LAD, no JVD  Lungs: CTAB, no wheezing, rales, or crackles. In no respiratory distress. CV: RRR, S1/S2 normal. No MRG. Abdomen: Soft, NT, ND. Normoactive bowel sounds. Extremities: No cyanosis or edema. Skin: No rashes or lesions  Neuro: Aox3, no focal motor or sensory deficits       Intake and Output:  Current Shift: No intake/output data recorded. Last three shifts: 03/05 0701 - 03/06 1900  In: 1241.5 [P.O.:600;  I.V.:641.5]  Out: 1200 [Urine:1200]    Lab/Data Review:  Recent Results (from the past 12 hour(s))   PTT    Collection Time: 03/06/22  9:04 AM   Result Value Ref Range    aPTT 28.8 23.0 - 36.4 SEC   ECHO ADULT COMPLETE    Collection Time: 03/06/22  4:59 PM   Result Value Ref Range    IVSd 1.2 (A) 0.6 - 0.9 cm    LVIDd 3.6 (A) 3.9 - 5.3 cm    LVIDs 2.7 cm    LVOT Diameter 1.9 cm    LVPWd 1.2 (A) 0.6 - 0.9 cm    LVOT SV 67.2 ml    LVOT Peak Gradient 8 mmHg    LVOT Mean Gradient 4 mmHg    LVOT Peak Velocity 1.4 m/s    LVOT VTI 23.7 cm    RV Free Wall Peak S' 14 cm/s    LA Volume A/L 56 mL    LA Volume 2C 54 (A) 22 - 52 mL    LA Volume 4C 49 22 - 52 mL    MV A Velocity 1.00 m/s    MV E Wave Deceleration Time 228.6 ms    MV E Velocity 0.94 m/s    LV E' Lateral Velocity 7 cm/s    LV E' Septal Velocity 7 cm/s    TAPSE 2.0 1.5 - 2.0 cm    TR Peak Gradient 14 mmHg    TR Max Velocity 1.90 m/s    Aortic Root 2.9 cm    Fractional Shortening 2D 25 28 - 44 % LVIDd Index 1.79 cm/m2    LVIDs Index 1.34 cm/m2    LV RWT Ratio 0.67     LV Mass 2D 141.5 67 - 162 g    LV Mass 2D Index 70.4 43 - 95 g/m2    MV E/A 0.94     E/E' Ratio (Averaged) 13.43     E/E' Lateral 13.43     E/E' Septal 13.43     LA Volume Index A/L 28 16 - 34 mL/m2    LVOT Stroke Volume Index 33.4 mL/m2    LVOT Area 2.8 cm2    LA Volume Index 2C 27 16 - 34 mL/m2    LA Volume Index 4C 24 16 - 34 mL/m2    Ao Root Index 1.44 cm/m2    Est. RA Pressure 8 mmHg    RVSP 22 mmHg    PASP 22 mmHg         Key Findings or tests:       Assessment and Plan: Active Problems:    STEMI (ST elevation myocardial infarction) ([facility] [address] (3/5/2022)        Problem:  · STEMI w in stent occlusion in LAD, s/p PCI  · Obesity  · HTN  · HLD  · Ongoing tobacco use     Plan:  · Transfer to [facility] for post STEMI monitoring  · Start Brilinta and aspirin - was noncompliant with aspirin therapy. · starting low-dose Coreg given her previous bradycardia. Can titrate up as tolerated to goal of 25 twice daily  · Start nicotine patch - recommend discharging with Chantix  · Start lisinopril and atorvastatin 80  · Follow-up HbA1c, TSH, and lipid panel  · Follow-up echo  · Follow further cardiology recs  · Orders placed for cardiac rehab  · Recommend weight loss. Nutrition consult.      Edward Willis DO, hospitalist   March 6, 2022

## 2022-03-07 NOTE — PROGRESS NOTES
Problem: Cath Lab Procedures: Pre-Procedure  Goal: Off Pathway (Use only if patient is Off Pathway)  Outcome: Progressing Towards Goal  Goal: Activity/Safety  Outcome: Progressing Towards Goal  Goal: Consults, if ordered  Outcome: Progressing Towards Goal  Goal: Diagnostic Test/Procedures  Outcome: Progressing Towards Goal  Goal: Nutrition/Diet  Outcome: Progressing Towards Goal  Goal: Discharge Planning  Outcome: Progressing Towards Goal  Goal: Medications  Outcome: Progressing Towards Goal  Goal: Respiratory  Outcome: Progressing Towards Goal  Goal: Treatments/Interventions/Procedures  Outcome: Progressing Towards Goal  Goal: Psychosocial  Outcome: Progressing Towards Goal  Goal: *Verbalize description of procedure  Outcome: Progressing Towards Goal  Goal: *Consent signed  Outcome: Progressing Towards Goal

## 2022-03-07 NOTE — PROGRESS NOTES
D/C order noted for today. Orders reviewed. No needs identified at this time.            MARILU Clifton RN  Care Management  Pager: [phone]

## 2022-03-07 NOTE — PROGRESS NOTES
Problem: Cath Lab Procedures: Pre-Procedure  Goal: Activity/Safety  Outcome: Progressing Towards Goal  Goal: Nutrition/Diet  Outcome: Progressing Towards Goal  Goal: Medications  Outcome: Progressing Towards Goal  Goal: Treatments/Interventions/Procedures  Outcome: Progressing Towards Goal  Goal: Psychosocial  Outcome: Progressing Towards Goal     Problem: Cath Lab Procedures: Post-Cath Day of Procedure (Initiate SCIP Measures for Post-Op Care)  Goal: Respiratory  Outcome: Progressing Towards Goal

## 2022-03-07 NOTE — PROGRESS NOTES
Cardiology Progress Note    Admit Date: 3/5/2022  Attending Cardiologist: Dr. Zeb Blair:     Hospital Problems  Date Reviewed: 3/5/2022          Codes Class Noted POA    * (Principal) STEMI (ST elevation myocardial infarction) ([facility] [address] ICD-10-CM: I21.3  ICD-9-CM: 410.90  3/5/2022 Yes        Ventricular tachycardia ([facility] [address] ICD-10-CM: I47.2  ICD-9-CM: 427.1  3/5/2022 Yes        Cardiac arrest ([facility] [address] ICD-10-CM: I46.9  ICD-9-CM: 427.5  3/5/2022 Yes        Tobacco abuse ICD-10-CM: Z72.0  ICD-9-CM: 305.1  3/5/2022 Yes        Morbid obesity ([facility] [address] ICD-10-CM: E66.01  ICD-9-CM: 278.01  3/5/2022 Yes              - Patient presented via EMS following call with complaints of chest pain. - Patient became unresponsive. S/p CPR and 1 shock for Vtach.  - STEMI on 3.5.22.   - Cardiac arrest  - Ischemic cardiomyopathy  - Hx of PCI to LAD in 2007. - Dyslipidemia  - Obesity  - Tobacco abuse    Cardiac catheterization 3.5.22:  · Acute ST elevation MI from totally occluded previously deployed mid LAD stent. · 50% mid circumflex lesion and diffuse luminal irregularities throughout coronary vasculature. · Successful PCI of the mid LAD deploying a drug-eluting stent without any complications. Stenting done within the previously deployed stent but involves a longer segment today. · Severely elevated LVEDP of 29 mmHg. · Severely reduced ejection fraction in the range of 30 to 35% by visual estimate and wall motion abnormalities as described. · Significant ventricular ectopy including ventricular bigeminy seen as the artery opened up requiring 1 dose of IV amiodarone. · IV Integrilin given for intracoronary thrombus-small burden. Heparin infusion. No primary cardiologist, Dr. Ray Castaneda performed initial consult. Plan:     Addendum: Independently seen and evaluated. Agree with below. She has no new complaints of chest pains. Again, strong encouragement was given for tobacco cessation as well as medication compliance. Increase activity as tolerated. If she is able to resume activities of daily living, she can likely be discharged to home with outpatient follow-up on optimal medical therapy. S/p emergent cardiac catheterization on 3.5. 22. Successful drug-eluting PCI to mid LAD. Continue brilinta. Hemodynamically stable however with soft blood pressures. LV dysfunction noted on cath, EF 30-35%. Echo with EF of 35-40%. Ischemic CM: Continue low dose beta-blocker/ACEi given soft bps for goal directed medical therapy. CAD: Continue beta-blocker, aspirin and statin. Further recommendations per Dr. Chiqusi Nicholas. Subjective:     No new complaints.      Objective:      Patient Vitals for the past 8 hrs:   Temp Pulse Resp BP SpO2   03/07/22 0831 -- 72 -- 98/61 --   03/07/22 0800 -- 83 -- -- --   03/07/22 0737 -- 73 (!) 33 95/71 97 %   03/07/22 0520 98.4 °F (36.9 °C) 80 24 103/65 96 %         Patient Vitals for the past 96 hrs:   Weight   03/06/22 1021 96.2 kg (212 lb)   03/06/22 0600 96.4 kg (212 lb 9.6 oz)   03/05/22 1442 108.9 kg (240 lb)   03/05/22 1441 108.9 kg (240 lb)       TELE: normal sinus rhythm      Current Facility-Administered Medications   Medication Dose Route Frequency Last Admin    heparin 25,000 units in  ml infusion  9-25 Units/kg/hr IntraVENous TITRATE Stopped at 03/05/22 1532    sodium chloride (NS) flush 5-40 mL  5-40 mL IntraVENous Q8H 10 mL at 03/07/22 0520    sodium chloride (NS) flush 5-40 mL  5-40 mL IntraVENous PRN      magnesium hydroxide (MILK OF MAGNESIA) 400 mg/5 mL oral suspension 30 mL  30 mL Oral DAILY PRN      docusate sodium (COLACE) capsule 100 mg  100 mg Oral  mg at 03/07/22 0831    carvediloL (COREG) tablet 3.125 mg  3.125 mg Oral BID WITH MEALS 3.125 mg at 03/07/22 0831    aspirin chewable tablet 81 mg  81 mg Oral DAILY 81 mg at 03/07/22 0831    lisinopriL (PRINIVIL, ZESTRIL) tablet 5 mg  5 mg Oral DAILY 5 mg at 03/06/22 0834    atorvastatin (LIPITOR) tablet 80 mg  80 mg Oral QHS 80 mg at 03/06/22 2127    ticagrelor (BRILINTA) tablet 90 mg  90 mg Oral Q12H 90 mg at 03/07/22 0520    sodium chloride (NS) flush 5-40 mL  5-40 mL IntraVENous Q8H 10 mL at 03/07/22 0520    sodium chloride (NS) flush 5-40 mL  5-40 mL IntraVENous PRN      acetaminophen (TYLENOL) tablet 650 mg  650 mg Oral Q4H  mg at 03/06/22 1709    nitroglycerin (NITROSTAT) tablet 0.4 mg  0.4 mg SubLINGual Q5MIN PRN      atropine 1 mg/mL injection 0.5 mg  0.5 mg IntraVENous PRN      nicotine (NICODERM CQ) 21 mg/24 hr patch 1 Patch  1 Patch TransDERmal DAILY 1 Patch at 03/07/22 0831    ELECTROLYTE REPLACEMENT PROTOCOL - Potassium   1 Each Other PRN      ELECTROLYTE REPLACEMENT PROTOCOL - Magnesium  1 Each Other PRN      ELECTROLYTE REPLACEMENT PROTOCOL - Phosphorus  1 Each Other PRN      ELECTROLYTE REPLACEMENT PROTOCOL - Calcium  1 Each Other PRN           Intake/Output Summary (Last 24 hours) at 3/7/2022 1127  Last data filed at 3/6/2022 1745  Gross per 24 hour   Intake 360 ml   Output --   Net 360 ml       Physical Exam:  General:  alert, cooperative, no distress, appears stated age, morbidly obese  Neck:  no carotid bruit, no JVD  Lungs:  clear to auscultation bilaterally  Heart:  regular rate and rhythm, S1, S2 normal, no murmur, click, rub or gallop  Abdomen:  abdomen is soft without significant tenderness, masses, organomegaly or guarding  Extremities:  extremities normal, atraumatic, no cyanosis or edema  No groin hematoma or bruits.   Distal pulses are present    Visit Vitals  BP 98/61   Pulse 72   Temp 98.4 °F (36.9 °C)   Resp (!) 33   Ht 5' 4\" (1.626 m)   Wt 96.2 kg (212 lb)   SpO2 97%   BMI 36.39 kg/m²       Data Review:     Labs: Results:       Chemistry Recent Labs     03/06/22  0229 03/05/22  1435   * 137*    141   K 4.9 3.4*    109   CO2 23 26   BUN 21* 19*   CREA 0.72 1.13   CA 8.5 9.4   MG 1.9  --    AGAP 5 6   BUCR 29* 17   AP  --  54   TP  --  7.0   ALB  -- 3. 7   GLOB  --  3.3   AGRAT  --  1.1      CBC w/Diff Recent Labs     03/07/22  0444 03/06/22  0229 03/05/22  1435   WBC 10.0 10.6 12.6   RBC 4.09* 4.11* 4.60   HGB 12.8 13.1 14.8   HCT 40.5 40.8 44.8    246 316   GRANS 68 85* 55   LYMPH 21 10* 33   EOS 2 0 3      Cardiac Enzymes No results found for: CPK, CK, CKMMB, CKMB, RCK3, CKMBT, CKNDX, CKND1, ROLANDA, TROPT, TROIQ, ALLY, TROPT, TNIPOC, BNP, BNPP   Coagulation Recent Labs     03/06/22  0904 03/06/22 0229   APTT 28.8 29.7       Lipid Panel Lab Results   Component Value Date/Time    Cholesterol, total 264 (H) 03/06/2022 02:29 AM    HDL Cholesterol 33 (L) 03/06/2022 02:29 AM    LDL, calculated 183.8 (H) 03/06/2022 02:29 AM    VLDL, calculated 47.2 03/06/2022 02:29 AM    Triglyceride 236 (H) 03/06/2022 02:29 AM    CHOL/HDL Ratio 8.0 (H) 03/06/2022 02:29 AM      BNP No results found for: BNP, BNPP, XBNPT   Liver Enzymes Recent Labs     03/05/22  1435   TP 7.0   ALB 3.7   AP 54      Thyroid Studies Lab Results   Component Value Date/Time    TSH 0.71 03/06/2022 02:29 AM          Signed By: Julianne Vincent NP     March 7, 2022

## 2022-03-08 LAB — ACT BLD: 279 SECS (ref 79–138)

## 2022-03-09 NOTE — PROGRESS NOTES
Pt's discharge summary faxed [phone] to [facility] as requested.           ASHLYN MaldonadoN RN  Care Management  Pager: [phone]

## 2022-03-18 PROBLEM — I21.3 STEMI (ST ELEVATION MYOCARDIAL INFARCTION) (HCC): Status: ACTIVE | Noted: 2022-03-05

## 2022-03-18 PROBLEM — E66.01 MORBID OBESITY (HCC): Status: ACTIVE | Noted: 2022-03-05

## 2022-03-19 PROBLEM — I47.20 VENTRICULAR TACHYCARDIA (HCC): Status: ACTIVE | Noted: 2022-03-05

## 2022-03-19 PROBLEM — I46.9 CARDIAC ARREST (HCC): Status: ACTIVE | Noted: 2022-03-05

## 2022-03-20 PROBLEM — Z72.0 TOBACCO ABUSE: Status: ACTIVE | Noted: 2022-03-05

## 2022-03-31 ENCOUNTER — OFFICE VISIT (OUTPATIENT)
Dept: CARDIOLOGY CLINIC | Age: 57
End: 2022-03-31
Payer: COMMERCIAL

## 2022-03-31 VITALS
OXYGEN SATURATION: 97 % | BODY MASS INDEX: 35.68 KG/M2 | SYSTOLIC BLOOD PRESSURE: 98 MMHG | WEIGHT: 209 LBS | HEART RATE: 72 BPM | DIASTOLIC BLOOD PRESSURE: 69 MMHG | HEIGHT: 64 IN

## 2022-03-31 DIAGNOSIS — I25.10 ATHEROSCLEROSIS OF NATIVE CORONARY ARTERY OF NATIVE HEART WITHOUT ANGINA PECTORIS: ICD-10-CM

## 2022-03-31 DIAGNOSIS — Z72.0 TOBACCO ABUSE: ICD-10-CM

## 2022-03-31 DIAGNOSIS — I46.9 CARDIAC ARREST (HCC): ICD-10-CM

## 2022-03-31 DIAGNOSIS — I47.20 VENTRICULAR TACHYCARDIA: Primary | ICD-10-CM

## 2022-03-31 DIAGNOSIS — E66.01 MORBID OBESITY (HCC): ICD-10-CM

## 2022-03-31 PROCEDURE — 99214 OFFICE O/P EST MOD 30 MIN: CPT | Performed by: NURSE PRACTITIONER

## 2022-03-31 RX ORDER — CETIRIZINE HYDROCHLORIDE 10 MG/1
CAPSULE, LIQUID FILLED ORAL
COMMUNITY

## 2022-03-31 RX ORDER — GUAIFENESIN 100 MG/5ML
81 LIQUID (ML) ORAL DAILY
Qty: 90 TABLET | Refills: 1 | Status: SHIPPED | OUTPATIENT
Start: 2022-03-31 | End: 2022-07-13 | Stop reason: SDUPTHER

## 2022-03-31 RX ORDER — ATORVASTATIN CALCIUM 80 MG/1
80 TABLET, FILM COATED ORAL
Qty: 90 TABLET | Refills: 1 | Status: SHIPPED | OUTPATIENT
Start: 2022-03-31 | End: 2022-07-13 | Stop reason: SDUPTHER

## 2022-03-31 RX ORDER — CARVEDILOL 3.12 MG/1
3.12 TABLET ORAL 2 TIMES DAILY WITH MEALS
Qty: 180 TABLET | Refills: 1 | Status: SHIPPED | OUTPATIENT
Start: 2022-03-31 | End: 2022-07-13 | Stop reason: SDUPTHER

## 2022-03-31 RX ORDER — LISINOPRIL 5 MG/1
5 TABLET ORAL DAILY
Qty: 90 TABLET | Refills: 1 | Status: SHIPPED
Start: 2022-03-31 | End: 2022-07-13 | Stop reason: ALTCHOICE

## 2022-03-31 RX ORDER — IBUPROFEN 200 MG
1 TABLET ORAL EVERY 24 HOURS
Qty: 30 PATCH | Refills: 0 | Status: SHIPPED | OUTPATIENT
Start: 2022-03-31 | End: 2022-04-30

## 2022-03-31 RX ORDER — BISMUTH SUBSALICYLATE 262 MG
1 TABLET,CHEWABLE ORAL DAILY
COMMUNITY

## 2022-03-31 NOTE — LETTER
NOTIFICATION RETURN TO WORK / SCHOOL    3/31/2022 1:14 PM    Ms. Isaiah Coley [address] [facility] [id number]      To Whom It May Concern:    Durward Leventhal is currently under the care of [address] [place] [address],[address]. She will return to work/school on:  4/1/2022 with restrictions of no pushing, pulling, lifting of more than 20 lbs. Until after 4/14/2022. If there are questions or concerns please have the patient contact our office.         Sincerely,      Lisha Limon NP  [facility]

## 2022-03-31 NOTE — PATIENT INSTRUCTIONS
Heart-Healthy Diet: Care Instructions  Your Care Instructions     A heart-healthy diet has lots of vegetables, fruits, nuts, beans, and whole grains, and is low in salt. It limits foods that are high in saturated fat, such as meats, cheeses, and fried foods. It may be hard to change your diet, but even small changes can lower your risk of heart attack and heart disease. Follow-up care is a key part of your treatment and safety. Be sure to make and go to all appointments, and call your doctor if you are having problems. It's also a good idea to know your test results and keep a list of the medicines you take. How can you care for yourself at home? Watch your portions  · Use food labels to learn what the recommended servings are for the foods you eat. · Eat only the number of calories you need to stay at a healthy weight. If you need to lose weight, eat fewer calories than your body burns (through exercise and other physical activity). Eat more fruits and vegetables  · Eat a variety of fruit and vegetables every day. Dark green, deep orange, red, or yellow fruits and vegetables are especially good for you. Examples include spinach, carrots, peaches, and berries. · Keep carrots, celery, and other veggies handy for snacks. Buy fruit that is in season and store it where you can see it so that you will be tempted to eat it. · Cook dishes that have a lot of veggies in them, such as stir-fries and soups. Limit saturated fat  · Read food labels, and try to avoid saturated fats. They increase your risk of heart disease. · Use olive or canola oil when you cook. · Bake, broil, grill, or steam foods instead of frying them. · Choose lean meats instead of high-fat meats such as hot dogs and sausages. Cut off all visible fat when you prepare meat. · Eat fish, skinless poultry, and meat alternatives such as soy products instead of high-fat meats.  Soy products, such as tofu, may be especially good for your heart.  · Choose low-fat or fat-free milk and dairy products. Eat foods high in fiber  · Eat a variety of grain products every day. Include whole-grain foods that have lots of fiber and nutrients. Examples of whole-grain foods include oats, whole wheat bread, and brown rice. · Buy whole-grain breads and cereals, instead of white bread or pastries. Limit salt and sodium  · Limit how much salt and sodium you eat to help lower your blood pressure. · Taste food before you salt it. Add only a little salt when you think you need it. With time, your taste buds will adjust to less salt. · Eat fewer snack items, fast foods, and other high-salt, processed foods. Check food labels for the amount of sodium in packaged foods. · Choose low-sodium versions of canned goods (such as soups, vegetables, and beans). Limit sugar  · Limit drinks and foods with added sugar. These include candy, desserts, and soda pop. Limit alcohol  · Limit alcohol to no more than 2 drinks a day for men and 1 drink a day for women. Too much alcohol can cause health problems. When should you call for help? Watch closely for changes in your health, and be sure to contact your doctor if:    · You would like help planning heart-healthy meals. Where can you learn more? Go to http://www.morris.com/  Enter V137 in the search box to learn more about \"Heart-Healthy Diet: Care Instructions. \"  Current as of: September 8, 2021               Content Version: 13.2  © 2006-2022 [employer]. Care instructions adapted under license by [employer] (which disclaims liability or warranty for this information). If you have questions about a medical condition or this instruction, always ask your healthcare professional. [place] [address] any warranty or liability for your use of this information.

## 2022-03-31 NOTE — PROGRESS NOTES
HISTORY OF PRESENT ILLNESS  Annie Khan is a 49 y.o. female. 3/2022  Patient seen following hospitalization for STEMI, and cardiac arrest.  She was taken emergently to Cath Lab and received stent to LAD. Echocardiogram revealed EF 35-40%. Since discharge she denies chest pain, shortness of breath or palpitations she has mild lower extremity edema for which she is wearing compression stockings. She has decreased smoking to 2 cigarettes/day and is attempting to quit with the aid of nicotine patches. Hospital Follow Up  The history is provided by the patient and medical records. Pertinent negatives include no chest pain and no shortness of breath. Family History   Problem Relation Age of Onset    Esophageal Cancer Mother     Breast Cancer Father        Past Medical History:   Diagnosis Date    CAD in native artery     Hyperlipidemia     Hypertension     Obesity, Class III, BMI 40-49.9 (morbid obesity) ([address]        Past Surgical History:   Procedure Laterality Date    HX CORONARY STENT PLACEMENT      MT CARDIAC SURG PROCEDURE UNLIST         Social History     Tobacco Use    Smoking status: Current Every Day Smoker     Packs/day: 0.25     Years: 40.00     Pack years: 10.00    Smokeless tobacco: Never Used   Substance Use Topics    Alcohol use: Not Currently       No Known Allergies    Prior to Admission medications    Medication Sig Start Date End Date Taking? Authorizing Provider   Cetirizine (ZyrTEC) 10 mg cap Take  by mouth. Yes Provider, Historical   multivitamin (ONE A DAY) tablet Take 1 Tablet by mouth daily. Yes Provider, Historical   nicotine (NICODERM CQ) 14 mg/24 hr patch 1 Patch by TransDERmal route every twenty-four (24) hours for 30 days. 3/31/22 4/30/22 Yes Andrea Rocha NP   ticagrelor (BRILINTA) 90 mg tablet Take 1 Tablet by mouth every twelve (12) hours every twelve (12) hours. 3/31/22  Yes Andrea Rocha NP   aspirin 81 mg chewable tablet Take 1 Tablet by mouth daily. 3/31/22  Yes Black, Umaa G, NP   lisinopriL (PRINIVIL, ZESTRIL) 5 mg tablet Take 1 Tablet by mouth daily. 3/31/22  Yes Uma Rochaa G, NP   atorvastatin (LIPITOR) 80 mg tablet Take 1 Tablet by mouth nightly. 3/31/22  Yes Black, Myria G, NP   carvediloL (COREG) 3.125 mg tablet Take 1 Tablet by mouth two (2) times daily (with meals). 3/31/22  Yes Andrea Rocha G, DANO   aspirin 81 mg chewable tablet Take 1 Tablet by mouth daily. 3/8/22 3/31/22  Sweta Ramírez DO   atorvastatin (LIPITOR) 80 mg tablet Take 1 Tablet by mouth nightly. 3/7/22 3/31/22  Sweta Ramírez DO   carvediloL (COREG) 3.125 mg tablet Take 1 Tablet by mouth two (2) times daily (with meals). 3/7/22 3/31/22  Sweta Ramírez DO   lisinopriL (PRINIVIL, ZESTRIL) 5 mg tablet Take 1 Tablet by mouth daily. 3/8/22 3/31/22  Sweta Ramírez DO   nicotine (NICODERM CQ) 21 mg/24 hr 1 Patch by TransDERmal route daily for 30 days. 3/8/22 3/31/22  Eloy Ramos DO   ticagrelor (BRILINTA) 90 mg tablet Take 1 Tablet by mouth every twelve (12) hours every twelve (12) hours. 3/7/22 3/31/22  Eloy Ramos DO         Visit Vitals  BP 98/69 (BP 1 Location: Left upper arm, BP Patient Position: Standing)   Pulse 72   Ht 5' 4\" (1.626 m)   Wt 94.8 kg (209 lb)   SpO2 97%   BMI 35.87 kg/m²       Review of Systems   Constitutional: Negative for malaise/fatigue. HENT: Negative for congestion. Eyes: Negative for double vision and redness. Respiratory: Negative for cough, shortness of breath and wheezing. Cardiovascular: Negative for chest pain, palpitations, orthopnea, claudication, leg swelling and PND. Gastrointestinal: Negative for nausea and vomiting. Musculoskeletal: Negative for falls. Neurological: Negative for dizziness. Endo/Heme/Allergies: Does not bruise/bleed easily. Physical Exam  Vitals and nursing note reviewed. Constitutional:       Appearance: She is well-developed. Neck:      Vascular: No JVD.    Cardiovascular:      Rate and Rhythm: Normal rate and regular rhythm. Pulses: Intact distal pulses. Heart sounds: Normal heart sounds. No murmur heard. No friction rub. No gallop. Pulmonary:      Effort: Pulmonary effort is normal. No respiratory distress. Breath sounds: Normal breath sounds. No wheezing or rales. Chest:      Chest wall: No tenderness. Abdominal:      General: There is no distension. Palpations: Abdomen is soft. There is no mass. Tenderness: There is no abdominal tenderness. Musculoskeletal:         General: Normal range of motion. Right lower leg: No edema. Left lower leg: No edema. Skin:     General: Skin is warm and dry. Neurological:      Mental Status: She is alert and oriented to person, place, and time. 3/2022   Cardiac cath  Conclusion       · Acute ST elevation MI from totally occluded previously deployed mid LAD stent. · 50% mid circumflex lesion and diffuse luminal irregularities throughout coronary vasculature. · Successful PCI of the mid LAD deploying a drug-eluting stent without any complications. Stenting done within the previously deployed stent but involves a longer segment today. · Severely elevated LVEDP of 29 mmHg. · Severely reduced ejection fraction in the range of 30 to 35% by visual estimate and wall motion abnormalities as described. · Significant ventricular ectopy including ventricular bigeminy seen as the artery opened up requiring 1 dose of IV amiodarone. · Procedure done via right femoral artery without any complications and vascular closure by Angio-Seal.     Medical treatment and risk factor modification. May be a candidate for primary prophylaxis with ICD in future if LV function function does not recover. Compliance with medications and risk factor modification to be stressed during the hospital stay. Discussed with family and have already stressed to them the importance of medications and risk factors.     Echo 3/2022  Interpretation Summary         Left Ventricle: Left ventricle size is normal. Mildly increased wall thickness. See diagram for wall motion findings. Moderately reduced left ventricular systolic function with a visually estimated EF of 35 - 40%. Grade II diastolic dysfunction with increased LAP. ASSESSMENT and PLAN    Ms. Nicky Hung has a reminder for a \"due or due soon\" health maintenance. I have asked that she contact her primary care provider for follow-up on this health maintenance. No flowsheet data found. Assessment         ICD-10-CM ICD-9-CM    1. Ventricular tachycardia (HCC)  I47.2 427.1    2. Atherosclerosis of native coronary artery of native heart without angina pectoris  I25.10 414.01 ticagrelor (BRILINTA) 90 mg tablet      aspirin 81 mg chewable tablet      lisinopriL (PRINIVIL, ZESTRIL) 5 mg tablet      atorvastatin (LIPITOR) 80 mg tablet      carvediloL (COREG) 3.125 mg tablet   3. Cardiac arrest (HCC)  I46.9 427.5    4. Tobacco abuse  Z72.0 305.1    5. Morbid obesity ([facility] [address]  E66.01 278.01      3/2022  Patient seen status post STEMI/cardiac arrest status post BRITNEY to LAD. Cardiac cath reviewed and discussed with patient LAD open to 0% stenosis, mid circumflex with 50% stenosis. Echocardiogram reviewed and discussed with patient EF 35-40% no valvular pathology noted, continue optimal medication of carvedilol with lisinopril. Unable to uptitrate at this time due to low blood pressure. Continue aspirin, Brilinta and statin. Plan to repeat fasting lipids in 4 to 6 weeks. Stressed importance of continued DAPT. Discussed cardiac rehab, patient would like to think about this and let us know if she would like to attend. May return to work as  no lifting, pushing pulling greater than 25 pounds.   Medications Discontinued During This Encounter   Medication Reason    nicotine (NICODERM CQ) 21 mg/24 hr DOSE ADJUSTMENT    aspirin 81 mg chewable tablet REORDER    atorvastatin (LIPITOR) 80 mg tablet REORDER    carvediloL (COREG) 3.125 mg tablet REORDER    lisinopriL (PRINIVIL, ZESTRIL) 5 mg tablet REORDER    ticagrelor (BRILINTA) 90 mg tablet REORDER       Orders Placed This Encounter    nicotine (NICODERM CQ) 14 mg/24 hr patch     Si Patch by TransDERmal route every twenty-four (24) hours for 30 days. Dispense:  30 Patch     Refill:  0    ticagrelor (BRILINTA) 90 mg tablet     Sig: Take 1 Tablet by mouth every twelve (12) hours every twelve (12) hours. Dispense:  180 Tablet     Refill:  1    aspirin 81 mg chewable tablet     Sig: Take 1 Tablet by mouth daily. Dispense:  90 Tablet     Refill:  1    lisinopriL (PRINIVIL, ZESTRIL) 5 mg tablet     Sig: Take 1 Tablet by mouth daily. Dispense:  90 Tablet     Refill:  1    atorvastatin (LIPITOR) 80 mg tablet     Sig: Take 1 Tablet by mouth nightly. Dispense:  90 Tablet     Refill:  1    carvediloL (COREG) 3.125 mg tablet     Sig: Take 1 Tablet by mouth two (2) times daily (with meals). Dispense:  180 Tablet     Refill:  1       Follow-up and Dispositions    · Return in about 6 weeks (around 2022) for Follow up with Dr. Becky Villa.

## 2022-04-08 ENCOUNTER — TRANSCRIBE ORDER (OUTPATIENT)
Dept: SCHEDULING | Age: 57
End: 2022-04-08

## 2022-04-08 DIAGNOSIS — Z12.31 VISIT FOR SCREENING MAMMOGRAM: Primary | ICD-10-CM

## 2022-06-30 ENCOUNTER — HOSPITAL ENCOUNTER (OUTPATIENT)
Dept: MAMMOGRAPHY | Age: 57
Discharge: HOME | End: 2022-06-30
Attending: FAMILY MEDICINE
Payer: COMMERCIAL

## 2022-06-30 DIAGNOSIS — Z12.31 VISIT FOR SCREENING MAMMOGRAM: ICD-10-CM

## 2022-06-30 PROCEDURE — 77063 BREAST TOMOSYNTHESIS BI: CPT

## 2022-07-13 ENCOUNTER — OFFICE VISIT (OUTPATIENT)
Dept: CARDIOLOGY CLINIC | Age: 57
End: 2022-07-13
Payer: COMMERCIAL

## 2022-07-13 VITALS
BODY MASS INDEX: 35.68 KG/M2 | DIASTOLIC BLOOD PRESSURE: 64 MMHG | HEIGHT: 64 IN | WEIGHT: 209 LBS | OXYGEN SATURATION: 95 % | HEART RATE: 62 BPM | SYSTOLIC BLOOD PRESSURE: 111 MMHG

## 2022-07-13 DIAGNOSIS — Z86.74 HISTORY OF CARDIAC ARREST: ICD-10-CM

## 2022-07-13 DIAGNOSIS — I25.10 ATHEROSCLEROSIS OF NATIVE CORONARY ARTERY OF NATIVE HEART WITHOUT ANGINA PECTORIS: Primary | ICD-10-CM

## 2022-07-13 DIAGNOSIS — Z95.5 HISTORY OF CORONARY ARTERY STENT PLACEMENT: ICD-10-CM

## 2022-07-13 DIAGNOSIS — I25.5 ISCHEMIC CARDIOMYOPATHY: ICD-10-CM

## 2022-07-13 DIAGNOSIS — E78.5 DYSLIPIDEMIA: ICD-10-CM

## 2022-07-13 DIAGNOSIS — E66.01 MORBID OBESITY (HCC): ICD-10-CM

## 2022-07-13 DIAGNOSIS — Z72.0 TOBACCO ABUSE: ICD-10-CM

## 2022-07-13 PROCEDURE — 99214 OFFICE O/P EST MOD 30 MIN: CPT | Performed by: INTERNAL MEDICINE

## 2022-07-13 RX ORDER — ATORVASTATIN CALCIUM 80 MG/1
80 TABLET, FILM COATED ORAL
Qty: 90 TABLET | Refills: 1 | Status: SHIPPED | OUTPATIENT
Start: 2022-07-13

## 2022-07-13 RX ORDER — GUAIFENESIN 100 MG/5ML
81 LIQUID (ML) ORAL DAILY
Qty: 100 TABLET | Status: SHIPPED | OUTPATIENT
Start: 2022-07-13

## 2022-07-13 RX ORDER — SACUBITRIL AND VALSARTAN 24; 26 MG/1; MG/1
1 TABLET, FILM COATED ORAL 2 TIMES DAILY
Qty: 60 TABLET | Refills: 1 | Status: SHIPPED | OUTPATIENT
Start: 2022-07-13 | End: 2022-09-06

## 2022-07-13 RX ORDER — ALLOPURINOL 300 MG/1
300 TABLET ORAL DAILY
COMMUNITY

## 2022-07-13 RX ORDER — LISINOPRIL 5 MG/1
5 TABLET ORAL DAILY
Qty: 90 TABLET | Refills: 1 | Status: CANCELLED | OUTPATIENT
Start: 2022-07-13

## 2022-07-13 RX ORDER — BUPROPION HYDROCHLORIDE 150 MG/1
150 TABLET ORAL DAILY
COMMUNITY

## 2022-07-13 RX ORDER — CARVEDILOL 3.12 MG/1
3.12 TABLET ORAL 2 TIMES DAILY WITH MEALS
Qty: 180 TABLET | Refills: 1 | Status: SHIPPED | OUTPATIENT
Start: 2022-07-13

## 2022-07-13 NOTE — PATIENT INSTRUCTIONS
There are no discontinued medications. After the recommended changes have been made in blood pressure medicines, patient advised to keep BP/HR(pulse rate) chart twice daily and bring us results in next 4 to 5 days. Patient may send the results via \"My Chart\" if desired. Please rest for 5-10 minutes before checking blood pressure. Sit on a comfortable chair without crossing the legs and put your arm on a table. We recommend that you use an upper arm cuff. Check the blood pressure 3 times each time you check the blood pressure and record the lowest reading. If you check the blood pressure in both arms, use the higher reading. Start Entresto 36 to 48 hours after the last dose of lisinopril     Learning About the Mediterranean Diet  What is the Mediterranean diet? The Mediterranean diet is a style of eating rather than a diet plan. It features foods eaten in [place], [place], [place] and [place], and other countries along the [facility]. It emphasizes eating foods like fish, fruits, vegetables, beans, high-fiber breads and whole grains, nuts, and olive oil. This style of eating includes limited red meat, cheese, and sweets. Why choose the Mediterranean diet? A Mediterranean-style diet may improve heart health. It contains more fat than other heart-healthy diets. But the fats are mainly from nuts, unsaturated oils (such as fish oils and olive oil), and certain nut or seed oils (such as canola, soybean, or flaxseed oil). These fats may help protect the heart and blood vessels. How can you get started on the Mediterranean diet? Here are some things you can do to switch to a more Mediterranean way of eating. What to eat  · Eat a variety of fruits and vegetables each day, such as grapes, blueberries, tomatoes, broccoli, peppers, figs, olives, spinach, eggplant, beans, lentils, and chickpeas.   · Eat a variety of whole-grain foods each day, such as oats, brown rice, and whole wheat bread, pasta, and couscous. · Eat fish at least 2 times a week. Try tuna, salmon, mackerel, lake trout, herring, or sardines. · Eat moderate amounts of low-fat dairy products, such as milk, cheese, or yogurt. · Eat moderate amounts of poultry and eggs. · Choose healthy (unsaturated) fats, such as nuts, olive oil, and certain nut or seed oils like canola, soybean, and flaxseed. · Limit unhealthy (saturated) fats, such as butter, palm oil, and coconut oil. And limit fats found in animal products, such as meat and dairy products made with whole milk. Try to eat red meat only a few times a month in very small amounts. · Limit sweets and desserts to only a few times a week. This includes sugar-sweetened drinks like soda. The Mediterranean diet may also include red wine with your meal--1 glass each day for women and up to 2 glasses a day for men. Tips for eating at home  · Use herbs, spices, garlic, lemon zest, and citrus juice instead of salt to add flavor to foods. · Add avocado slices to your sandwich instead of wolf. · Have fish for lunch or dinner instead of red meat. Brush the fish with olive oil, and broil or grill it. · Sprinkle your salad with seeds or nuts instead of cheese. · Cook with olive or canola oil instead of butter or oils that are high in saturated fat. · Switch from 2% milk or whole milk to 1% or fat-free milk. · Dip raw vegetables in a vinaigrette dressing or hummus instead of dips made from mayonnaise or sour cream.  · Have a piece of fruit for dessert instead of a piece of cake. Try baked apples, or have some dried fruit. Tips for eating out  · Try broiled, grilled, baked, or poached fish instead of having it fried or breaded. · Ask your  to have your meals prepared with olive oil instead of butter. · Order dishes made with marinara sauce or sauces made from olive oil. Avoid sauces made from cream or mayonnaise.   · Choose whole-grain breads, whole wheat pasta and pizza crust, brown rice, beans, and lentils. · Cut back on butter or margarine on bread. Instead, you can dip your bread in a small amount of olive oil. · Ask for a side salad or grilled vegetables instead of french fries or chips. Where can you learn more? Go to http://www.morris.com/  Enter O407 in the search box to learn more about \"Learning About the Mediterranean Diet. \"  Current as of: September 8, 2021               Content Version: 13.2  © 2006-2022 Healthwise, [employer]. Care instructions adapted under license by [employer] (which disclaims liability or warranty for this information). If you have questions about a medical condition or this instruction, always ask your healthcare professional. [address] any warranty or liability for your use of this information.

## 2022-07-13 NOTE — PROGRESS NOTES
HISTORY OF PRESENT ILLNESS  Alize Garcia is a 56 y.o. female. 3/2022  Patient seen following hospitalization for STEMI, and cardiac arrest.  She was taken emergently to Cath Lab and received stent to LAD. Echocardiogram revealed EF 35-40%. Since discharge she denies chest pain, shortness of breath or palpitations she has mild lower extremity edema for which she is wearing compression stockings. She has decreased smoking to 2 cigarettes/day and is attempting to quit with the aid of nicotine patches. Follow-up  Pertinent negatives include no chest pain, no headaches and no shortness of breath. Family History   Problem Relation Age of Onset    Esophageal Cancer Mother     Breast Cancer Father     Cancer Father     Breast Cancer Maternal Grandmother     Breast Cancer Maternal Aunt     Breast Cancer Paternal Aunt     Cancer Maternal Aunt        Past Medical History:   Diagnosis Date    CAD in native artery     Hyperlipidemia     Hypertension     Obesity, Class III, BMI 40-49.9 (morbid obesity) ([facility] [address]        Past Surgical History:   Procedure Laterality Date    HX CORONARY STENT PLACEMENT      [place] CARDIAC SURG PROCEDURE UNLIST         Social History     Tobacco Use    Smoking status: Current Every Day Smoker     Packs/day: 0.25     Years: 40.00     Pack years: 10.00    Smokeless tobacco: Never Used    Tobacco comment: 1-2 daily   Substance Use Topics    Alcohol use: Not Currently       No Known Allergies    Prior to Admission medications    Medication Sig Start Date End Date Taking? Authorizing Provider   allopurinoL (ZYLOPRIM) 300 mg tablet Take 300 mg by mouth daily. Yes Provider, Historical   buPROPion XL (WELLBUTRIN XL) 150 mg tablet Take 150 mg by mouth daily. Yes Provider, Historical   Cetirizine (ZyrTEC) 10 mg cap Take  by mouth. Yes Provider, Historical   multivitamin (ONE A DAY) tablet Take 1 Tablet by mouth daily.    Yes Provider, Historical   ticagrelor (BRILINTA) 90 mg tablet Take 1 Tablet by mouth every twelve (12) hours every twelve (12) hours. 3/31/22  Yes Andrea Rocha NP   aspirin 81 mg chewable tablet Take 1 Tablet by mouth daily. 3/31/22  Yes Andrea Rocha NP   lisinopriL (PRINIVIL, ZESTRIL) 5 mg tablet Take 1 Tablet by mouth daily. 3/31/22  Yes Andrea Rocha NP   atorvastatin (LIPITOR) 80 mg tablet Take 1 Tablet by mouth nightly. 3/31/22  Yes Andrea Rocha NP   carvediloL (COREG) 3.125 mg tablet Take 1 Tablet by mouth two (2) times daily (with meals). 3/31/22  Yes Andrea Rocha NP         Visit Vitals  /64 (BP 1 Location: Left upper arm, BP Patient Position: Sitting, BP Cuff Size: Adult long)   Pulse 62   Ht 5' 4\" (1.626 m)   Wt 94.8 kg (209 lb)   SpO2 95%   BMI 35.87 kg/m²       Review of Systems   Constitutional: Negative for chills, fever, malaise/fatigue and weight loss. HENT: Negative for congestion and nosebleeds. Eyes: Negative for double vision, discharge and redness. Respiratory: Negative for cough, shortness of breath and wheezing. Cardiovascular: Negative for chest pain, palpitations, orthopnea, claudication, leg swelling and PND. Gastrointestinal: Negative for diarrhea, nausea and vomiting. Genitourinary: Negative for dysuria and hematuria. Musculoskeletal: Negative for falls and joint pain. Skin: Negative for rash. Neurological: Negative for dizziness, seizures, loss of consciousness and headaches. Endo/Heme/Allergies: Negative for polydipsia. Does not bruise/bleed easily. Psychiatric/Behavioral: Negative for depression and substance abuse. The patient does not have insomnia. Physical Exam  Vitals and nursing note reviewed. Constitutional:       Appearance: She is well-developed. She is obese. Neck:      Vascular: No JVD. Cardiovascular:      Rate and Rhythm: Normal rate and regular rhythm. Pulses: Intact distal pulses. Heart sounds: Normal heart sounds. No murmur heard. No friction rub.  No gallop. Pulmonary:      Effort: Pulmonary effort is normal. No respiratory distress. Breath sounds: Normal breath sounds. No wheezing or rales. Chest:      Chest wall: No tenderness. Abdominal:      General: There is no distension. Palpations: Abdomen is soft. There is no mass. Tenderness: There is no abdominal tenderness. Musculoskeletal:         General: Normal range of motion. Right lower leg: No edema. Left lower leg: No edema. Skin:     General: Skin is warm and dry. Neurological:      Mental Status: She is alert and oriented to person, place, and time. 3/2022   Cardiac cath  Conclusion       · Acute ST elevation MI from totally occluded previously deployed mid LAD stent. · 50% mid circumflex lesion and diffuse luminal irregularities throughout coronary vasculature. · Successful PCI of the mid LAD deploying a drug-eluting stent without any complications. Stenting done within the previously deployed stent but involves a longer segment today. · Severely elevated LVEDP of 29 mmHg. · Severely reduced ejection fraction in the range of 30 to 35% by visual estimate and wall motion abnormalities as described. · Significant ventricular ectopy including ventricular bigeminy seen as the artery opened up requiring 1 dose of IV amiodarone. · Procedure done via right femoral artery without any complications and vascular closure by Angio-Seal.     Medical treatment and risk factor modification. May be a candidate for primary prophylaxis with ICD in future if LV function function does not recover. Compliance with medications and risk factor modification to be stressed during the hospital stay. Discussed with family and have already stressed to them the importance of medications and risk factors. Echo    3/2022  Interpretation Summary         Left Ventricle: Left ventricle size is normal. Mildly increased wall thickness. See diagram for wall motion findings.  Moderately reduced left ventricular systolic function with a visually estimated EF of 35 - 40%. Grade II diastolic dysfunction with increased LAP. ASSESSMENT and PLAN    3/2022  Patient seen status post STEMI/cardiac arrest status post BRITNEY to LAD. Cardiac cath reviewed and discussed with patient LAD open to 0% stenosis, mid circumflex with 50% stenosis. Echocardiogram reviewed and discussed with patient EF 35-40% no valvular pathology noted, continue optimal medication of carvedilol with lisinopril. Unable to uptitrate at this time due to low blood pressure. Continue aspirin, Brilinta and statin. Plan to repeat fasting lipids in 4 to 6 weeks. Stressed importance of continued DAPT. Discussed cardiac rehab, patient would like to think about this and let us know if she would like to attend. May return to work as  no lifting, pushing pulling greater than 25 pounds. Diagnoses and all orders for this visit:    1. Atherosclerosis of native coronary artery of native heart without angina pectoris  -     carvediloL (COREG) 3.125 mg tablet; Take 1 Tablet by mouth two (2) times daily (with meals). -     atorvastatin (LIPITOR) 80 mg tablet; Take 1 Tablet by mouth nightly. -     aspirin 81 mg chewable tablet; Take 1 Tablet by mouth daily. -     ticagrelor (BRILINTA) 90 mg tablet; Take 1 Tablet by mouth every twelve (12) hours. 2. Tobacco abuse    3. Morbid obesity ([address]    4. History of coronary artery stent placement    5. History of cardiac arrest    6. Ischemic cardiomyopathy  -     sacubitriL-valsartan (Entresto) 24-26 mg tablet; Take 1 Tablet by mouth two (2) times a day. -     METABOLIC PANEL, BASIC; Future  -     empagliflozin (Jardiance) 10 mg tablet; Take 1 Tablet by mouth daily. 7. Dyslipidemia  -     LIPID PANEL; Future  -     HEPATIC FUNCTION PANEL; Future        Pertinent laboratory and test data reviewed and discussed with patient.   See patient instructions also for other medical advice given    Medications Discontinued During This Encounter   Medication Reason    lisinopriL (PRINIVIL, ZESTRIL) 5 mg tablet Alternate Therapy    ticagrelor (BRILINTA) 90 mg tablet REORDER    aspirin 81 mg chewable tablet REORDER    atorvastatin (LIPITOR) 80 mg tablet REORDER    carvediloL (COREG) 3.125 mg tablet REORDER       Follow-up and Dispositions    · Return in about 3 months (around 10/13/2022), or if symptoms worsen or fail to improve, for BP log x 4-5 days post med changes. 7/13/2022 CAD stable. Has ischemic cardiomyopathy due to previous MI. Change lisinopril to Entresto. Add Jardiance. She is reducing smoking for which she was congratulated and plans to stop it this month. She has not been able to lose weight. Discussed and given Mediterranean diet guidelines. Check labs after switching the medications including lipids.

## 2022-07-13 NOTE — PROGRESS NOTES
1. Have you been to the ER, urgent care clinic since your last visit? Hospitalized since your last visit? No    2. Have you seen or consulted any other health care providers outside of the [address] [place] [address] since your last visit? Include any pap smears or colon screening. No    3. Since your last visit, have you had any of the following symptoms? No.     4.  Have you had any blood work, X-rays or cardiac testing? Samina Alexander 3/6/22     Requested: NO     In [facility]: YES    5. Where do you normally have your labs drawn? PCP    6. Do you need any refills today?    Yes: aspirin, atorvastatin, lisinopril, carvedilol

## 2022-07-22 ENCOUNTER — HOSPITAL ENCOUNTER (OUTPATIENT)
Dept: LAB | Age: 57
Discharge: HOME | End: 2022-07-22
Payer: COMMERCIAL

## 2022-07-22 DIAGNOSIS — E78.5 DYSLIPIDEMIA: ICD-10-CM

## 2022-07-22 DIAGNOSIS — I25.5 ISCHEMIC CARDIOMYOPATHY: ICD-10-CM

## 2022-07-22 LAB
ALBUMIN SERPL-MCNC: 3.7 G/DL (ref 3.4–5)
ALBUMIN/GLOB SERPL: 1.1 {RATIO} (ref 0.8–1.7)
ALP SERPL-CCNC: 83 U/L (ref 45–117)
ALT SERPL-CCNC: 28 U/L (ref 13–56)
ANION GAP SERPL CALC-SCNC: 3 MMOL/L (ref 3–18)
AST SERPL-CCNC: 20 U/L (ref 10–38)
BILIRUB DIRECT SERPL-MCNC: 0.1 MG/DL (ref 0–0.2)
BILIRUB SERPL-MCNC: 0.7 MG/DL (ref 0.2–1)
BUN SERPL-MCNC: 18 MG/DL (ref 7–18)
BUN/CREAT SERPL: 16 (ref 12–20)
CALCIUM SERPL-MCNC: 9 MG/DL (ref 8.5–10.1)
CHLORIDE SERPL-SCNC: 108 MMOL/L (ref 100–111)
CHOLEST SERPL-MCNC: 138 MG/DL
CO2 SERPL-SCNC: 29 MMOL/L (ref 21–32)
CREAT SERPL-MCNC: 1.14 MG/DL (ref 0.6–1.3)
GLOBULIN SER CALC-MCNC: 3.3 G/DL (ref 2–4)
GLUCOSE SERPL-MCNC: 112 MG/DL (ref 74–99)
HDLC SERPL-MCNC: 37 MG/DL (ref 40–60)
HDLC SERPL: 3.7 {RATIO} (ref 0–5)
LDLC SERPL CALC-MCNC: 76.8 MG/DL (ref 0–100)
LIPID PROFILE,FLP: ABNORMAL
POTASSIUM SERPL-SCNC: 5.4 MMOL/L (ref 3.5–5.5)
PROT SERPL-MCNC: 7 G/DL (ref 6.4–8.2)
SODIUM SERPL-SCNC: 140 MMOL/L (ref 136–145)
TRIGL SERPL-MCNC: 121 MG/DL (ref ?–150)
VLDLC SERPL CALC-MCNC: 24.2 MG/DL

## 2022-07-22 PROCEDURE — 80076 HEPATIC FUNCTION PANEL: CPT

## 2022-07-22 PROCEDURE — 36415 COLL VENOUS BLD VENIPUNCTURE: CPT

## 2022-07-22 PROCEDURE — 80061 LIPID PANEL: CPT

## 2022-07-22 PROCEDURE — 80048 BASIC METABOLIC PNL TOTAL CA: CPT

## 2022-07-28 DIAGNOSIS — I25.10 ATHEROSCLEROSIS OF NATIVE CORONARY ARTERY OF NATIVE HEART WITHOUT ANGINA PECTORIS: ICD-10-CM

## 2022-09-03 DIAGNOSIS — I25.5 ISCHEMIC CARDIOMYOPATHY: ICD-10-CM

## 2022-09-06 RX ORDER — SACUBITRIL AND VALSARTAN 24; 26 MG/1; MG/1
TABLET, FILM COATED ORAL
Qty: 60 TABLET | Refills: 1 | Status: SHIPPED | OUTPATIENT
Start: 2022-09-06 | End: 2022-09-12 | Stop reason: SDUPTHER

## 2022-09-12 DIAGNOSIS — I25.5 ISCHEMIC CARDIOMYOPATHY: ICD-10-CM

## 2022-09-12 RX ORDER — SACUBITRIL AND VALSARTAN 24; 26 MG/1; MG/1
1 TABLET, FILM COATED ORAL 2 TIMES DAILY
Qty: 180 TABLET | Refills: 1 | Status: SHIPPED | OUTPATIENT
Start: 2022-09-12

## 2022-09-12 NOTE — TELEPHONE ENCOUNTER
Requested Prescriptions     Pending Prescriptions Disp Refills    sacubitriL-valsartan (Entresto) 24-26 mg tablet 90 Tablet 1     Sig: Take 1 Tablet by mouth two (2) times a day.

## 2022-12-12 ENCOUNTER — OFFICE VISIT (OUTPATIENT)
Dept: CARDIOLOGY CLINIC | Age: 57
End: 2022-12-12
Payer: COMMERCIAL

## 2022-12-12 VITALS
SYSTOLIC BLOOD PRESSURE: 112 MMHG | HEIGHT: 64 IN | BODY MASS INDEX: 36.19 KG/M2 | WEIGHT: 212 LBS | HEART RATE: 65 BPM | OXYGEN SATURATION: 97 % | DIASTOLIC BLOOD PRESSURE: 54 MMHG

## 2022-12-12 DIAGNOSIS — E66.01 MORBID OBESITY (HCC): ICD-10-CM

## 2022-12-12 DIAGNOSIS — I25.10 ATHEROSCLEROSIS OF NATIVE CORONARY ARTERY OF NATIVE HEART WITHOUT ANGINA PECTORIS: Primary | ICD-10-CM

## 2022-12-12 DIAGNOSIS — Z95.5 HISTORY OF CORONARY ARTERY STENT PLACEMENT: ICD-10-CM

## 2022-12-12 DIAGNOSIS — I25.5 ISCHEMIC CARDIOMYOPATHY: ICD-10-CM

## 2022-12-12 DIAGNOSIS — Z86.74 HISTORY OF CARDIAC ARREST: ICD-10-CM

## 2022-12-12 PROCEDURE — 99214 OFFICE O/P EST MOD 30 MIN: CPT | Performed by: INTERNAL MEDICINE

## 2022-12-12 RX ORDER — LEVOTHYROXINE SODIUM 125 UG/1
125 TABLET ORAL
COMMUNITY
Start: 2022-10-21

## 2022-12-12 RX ORDER — FLUTICASONE PROPIONATE 50 MCG
SPRAY, SUSPENSION (ML) NASAL
COMMUNITY
Start: 2022-10-01

## 2022-12-12 RX ORDER — EZETIMIBE 10 MG/1
10 TABLET ORAL DAILY
Qty: 30 TABLET | Refills: 3 | Status: SHIPPED | OUTPATIENT
Start: 2022-12-12

## 2022-12-12 RX ORDER — OMEPRAZOLE 10 MG/1
10 CAPSULE, DELAYED RELEASE ORAL AS NEEDED
COMMUNITY
Start: 2022-10-01

## 2022-12-12 RX ORDER — PRAMIPEXOLE DIHYDROCHLORIDE 0.12 MG/1
TABLET ORAL
COMMUNITY
Start: 2022-09-23

## 2022-12-12 NOTE — PROGRESS NOTES
HISTORY OF PRESENT ILLNESS  Keesha Trinidad is a 56 y.o. female. 3/2022  Patient seen following hospitalization for STEMI, and cardiac arrest.  She was taken emergently to Cath Lab and received stent to LAD. Echocardiogram revealed EF 35-40%. Since discharge she denies chest pain, shortness of breath or palpitations she has mild lower extremity edema for which she is wearing compression stockings. She has decreased smoking to 2 cigarettes/day and is attempting to quit with the aid of nicotine patches.  Patient denies significant chest pain, SOB, palpitations, edema, dizziness      Follow-up  Pertinent negatives include no chest pain, no headaches and no shortness of breath. Family History   Problem Relation Age of Onset    Esophageal Cancer Mother     Breast Cancer Father     Cancer Father     Breast Cancer Maternal Grandmother     Breast Cancer Maternal Aunt     Breast Cancer Paternal Aunt     Cancer Maternal Aunt        Past Medical History:   Diagnosis Date    CAD in native artery     Hyperlipidemia     Hypertension     Obesity, Class III, BMI 40-49.9 (morbid obesity) ([address]        Past Surgical History:   Procedure Laterality Date    HX CORONARY STENT PLACEMENT      [place] CARDIAC SURG PROCEDURE UNLIST         Social History     Tobacco Use    Smoking status: Former     Packs/day: 0.25     Years: 40.00     Pack years: 10.00     Types: Cigarettes     Quit date: 2022     Years since quittin.2    Smokeless tobacco: Never    Tobacco comments:     1-2 daily   Substance Use Topics    Alcohol use: Not Currently       No Known Allergies    Prior to Admission medications    Medication Sig Start Date End Date Taking? Authorizing Provider   levothyroxine (SYNTHROID) 125 mcg tablet Take 125 mcg by mouth Daily (before breakfast). 10/21/22  Yes Provider, Historical   omeprazole (PRILOSEC) 10 mg capsule Take 10 mg by mouth as needed.  10/1/22  Yes Provider, Historical   pramipexole (MIRAPEX) 0.125 mg tablet 9/23/22  Yes Provider, Historical   fluticasone propionate (FLONASE) 50 mcg/actuation nasal spray  10/1/22  Yes Provider, Historical   empagliflozin (JARDIANCE) 25 mg tablet Take 25 mg by mouth daily. 11/1/22  Yes Provider, Historical   clobetasoL-emollient no.65 0.05 % cmpk  6/17/22  Yes Provider, Historical   sacubitriL-valsartan (Entresto) 24-26 mg tablet Take 1 Tablet by mouth two (2) times a day. 9/12/22  Yes Margarette Driscoll MD   ticagrelor (BRILINTA) 90 mg tablet Take 1 Tablet by mouth every twelve (12) hours. 7/28/22  Yes Andrea Rocha NP   allopurinoL (ZYLOPRIM) 300 mg tablet Take 300 mg by mouth daily. Yes Provider, Historical   buPROPion XL (WELLBUTRIN XL) 150 mg tablet Take 150 mg by mouth daily. Yes Provider, Historical   carvediloL (COREG) 3.125 mg tablet Take 1 Tablet by mouth two (2) times daily (with meals). 7/13/22  Yes Margarette Driscoll MD   atorvastatin (LIPITOR) 80 mg tablet Take 1 Tablet by mouth nightly. 7/13/22  Yes Margarette Driscoll MD   aspirin 81 mg chewable tablet Take 1 Tablet by mouth daily. 7/13/22  Yes Margarette Driscoll MD   Cetirizine (ZyrTEC) 10 mg cap Take  by mouth. Yes Provider, Historical   multivitamin (ONE A DAY) tablet Take 1 Tablet by mouth daily. Yes Provider, Historical   empagliflozin (Jardiance) 10 mg tablet Take 1 Tablet by mouth daily. 7/13/22   Margarette Driscoll MD         Visit Vitals  BP (!) 112/54 (BP 1 Location: Left upper arm, BP Patient Position: Sitting, BP Cuff Size: Adult)   Pulse 65   Ht 5' 4\" (1.626 m)   Wt 96.2 kg (212 lb)   SpO2 97%   BMI 36.39 kg/m²       Review of Systems   Constitutional:  Negative for chills, fever, malaise/fatigue and weight loss. HENT:  Negative for congestion and nosebleeds. Eyes:  Negative for double vision, discharge and redness. Respiratory:  Negative for cough, shortness of breath and wheezing. Cardiovascular:  Negative for chest pain, palpitations, orthopnea, claudication, leg swelling and PND. Gastrointestinal:  Negative for diarrhea, nausea and vomiting. Genitourinary:  Negative for dysuria and hematuria. Musculoskeletal:  Negative for falls and joint pain. Skin:  Negative for rash. Neurological:  Negative for dizziness, seizures, loss of consciousness and headaches. Endo/Heme/Allergies:  Negative for polydipsia. Does not bruise/bleed easily. Psychiatric/Behavioral:  Negative for depression and substance abuse. The patient does not have insomnia. Physical Exam  Vitals and nursing note reviewed. Constitutional:       Appearance: She is well-developed. She is obese. Neck:      Vascular: No JVD. Cardiovascular:      Rate and Rhythm: Normal rate and regular rhythm. Pulses: Intact distal pulses. Heart sounds: Normal heart sounds. No murmur heard. No friction rub. No gallop. Pulmonary:      Effort: Pulmonary effort is normal. No respiratory distress. Breath sounds: Normal breath sounds. No wheezing or rales. Chest:      Chest wall: No tenderness. Abdominal:      General: There is no distension. Palpations: Abdomen is soft. There is no mass. Tenderness: There is no abdominal tenderness. Musculoskeletal:         General: Normal range of motion. Right lower leg: No edema. Left lower leg: No edema. Skin:     General: Skin is warm and dry. Neurological:      Mental Status: She is alert and oriented to person, place, and time. 3/2022   Cardiac cath  Conclusion       Acute ST elevation MI from totally occluded previously deployed mid LAD stent. 50% mid circumflex lesion and diffuse luminal irregularities throughout coronary vasculature. Successful PCI of the mid LAD deploying a drug-eluting stent without any complications. Stenting done within the previously deployed stent but involves a longer segment today. Severely elevated LVEDP of 29 mmHg.   Severely reduced ejection fraction in the range of 30 to 35% by visual estimate and wall motion abnormalities as described. Significant ventricular ectopy including ventricular bigeminy seen as the artery opened up requiring 1 dose of IV amiodarone. Procedure done via right femoral artery without any complications and vascular closure by Angio-Seal.     Medical treatment and risk factor modification. May be a candidate for primary prophylaxis with ICD in future if LV function function does not recover. Compliance with medications and risk factor modification to be stressed during the hospital stay. Discussed with family and have already stressed to them the importance of medications and risk factors. Echo    3/2022  Interpretation Summary         Left Ventricle: Left ventricle size is normal. Mildly increased wall thickness. See diagram for wall motion findings. Moderately reduced left ventricular systolic function with a visually estimated EF of 35 - 40%. Grade II diastolic dysfunction with increased LAP. ASSESSMENT and PLAN     Latest Reference Range & Units 3/5/22 17:58 3/6/22 02:29 7/22/22 07:43   Triglyceride <150 MG/ 236 (H) 121   Cholesterol, total <200 MG/ (H) 264 (H) 138   HDL Cholesterol 40 - 60 MG/DL 35 (L) 33 (L) 37 (L)   CHOL/HDL Ratio 0 - 5.0   8.3 (H) 8.0 (H) 3.7   LDL, calculated 0 - 100 MG/.2 (H) 183.8 (H) 76.8   VLDL, calculated MG/DL 24.8 47.2 24.2   (H): Data is abnormally high  (L): Data is abnormally low    3/2022  Patient seen status post STEMI/cardiac arrest status post BRITNEY to LAD. Cardiac cath reviewed and discussed with patient LAD open to 0% stenosis, mid circumflex with 50% stenosis. Echocardiogram reviewed and discussed with patient EF 35-40% no valvular pathology noted, continue optimal medication of carvedilol with lisinopril. Unable to uptitrate at this time due to low blood pressure. Continue aspirin, Brilinta and statin. Plan to repeat fasting lipids in 4 to 6 weeks. Stressed importance of continued DAPT.   Discussed cardiac rehab, patient would like to think about this and let us know if she would like to attend. May return to work as  no lifting, pushing pulling greater than 25 pounds. 7/13/2022 CAD stable. Has ischemic cardiomyopathy due to previous MI. Change lisinopril to Entresto. Add Jardiance. She is reducing smoking for which she was congratulated and plans to stop it this month. She has not been able to lose weight. Discussed and given Mediterranean diet guidelines. Check labs after switching the medications including lipids. Diagnoses and all orders for this visit:    1. Atherosclerosis of native coronary artery of native heart without angina pectoris  -     ECHO ADULT COMPLETE; Future    2. Ischemic cardiomyopathy  -     ECHO ADULT COMPLETE; Future  -     ezetimibe (Zetia) 10 mg tablet; Take 1 Tablet by mouth daily.  -     LIPID PANEL; Future  -     HEPATIC FUNCTION PANEL; Future    3. Morbid obesity ([facility] [address]    4. History of coronary artery stent placement    5. History of cardiac arrest        Pertinent laboratory and test data reviewed and discussed with patient. See patient instructions also for other medical advice given    Medications Discontinued During This Encounter   Medication Reason    empagliflozin (Jardiance) 10 mg tablet DOSE ADJUSTMENT       Follow-up and Dispositions    Return in about 3 months (around 3/12/2023), or if symptoms worsen or fail to improve, for post test.       12/12/2022 CAD is stable without any significant symptoms. No CHF with ischemic cardiomyopathy. Check echo as she is on max tolerated medications to decide on ICD. Lipids have dropped fairly well. But add Zetia to see if we can achieve further lowering of LDL. Follow labs. Detailed discussion of diet weight and exercise. She has stopped smoking and is trying to exercise at least 3 days a week for which she was congratulated.   She had gained some weight which is now downtrending which is also great.

## 2022-12-12 NOTE — PROGRESS NOTES
1. Have you been to the ER, urgent care clinic since your last visit? Hospitalized since your last visit? No    2. Have you seen or consulted any other health care providers outside of the [address] [place] [address] since your last visit? Include any pap smears or colon screening. Yes Where: Endocrinology abn  thyroid  PCP Routine     3. Since your last visit, have you had any of the following symptoms? NO.     4.  Have you had any blood work, X-rays or cardiac testing? Yes Where: PCP     Requested: NO     In [facility]: NO    5. Where do you normally have your labs drawn? PCP    6. Do you need any refills today?    NO

## 2022-12-12 NOTE — PATIENT INSTRUCTIONS
Medications Discontinued During This Encounter   Medication Reason    empagliflozin (Jardiance) 10 mg tablet DOSE ADJUSTMENT         A Healthy Heart: Care Instructions  Your Care Instructions     Coronary artery disease, also called heart disease, occurs when a substance called plaque builds up in the vessels that supply oxygen-rich blood to your heart muscle. This can narrow the blood vessels and reduce blood flow. A heart attack happens when blood flow is completely blocked. A high-fat diet, smoking, and other factors increase the risk of heart disease. Your doctor has found that you have a chance of having heart disease. You can do lots of things to keep your heart healthy. It may not be easy, but you can change your diet, exercise more, and quit smoking. These steps really work to lower your chance of heart disease. Follow-up care is a key part of your treatment and safety. Be sure to make and go to all appointments, and call your doctor if you are having problems. It's also a good idea to know your test results and keep a list of the medicines you take. How can you care for yourself at home? Diet    Use less salt when you cook and eat. This helps lower your blood pressure. Taste food before salting. Add only a little salt when you think you need it. With time, your taste buds will adjust to less salt. Eat fewer snack items, fast foods, canned soups, and other high-salt, high-fat, processed foods. Read food labels and try to avoid saturated and trans fats. They increase your risk of heart disease by raising cholesterol levels. Limit the amount of solid fat-butter, margarine, and shortening-you eat. Use olive, peanut, or canola oil when you cook. Bake, broil, and steam foods instead of frying them. Eat a variety of fruit and vegetables every day. Dark green, deep orange, red, or yellow fruits and vegetables are especially good for you. Examples include spinach, carrots, peaches, and berries. Foods high in fiber can reduce your cholesterol and provide important vitamins and minerals. High-fiber foods include whole-grain cereals and breads, oatmeal, beans, brown rice, citrus fruits, and apples. Eat lean proteins. Heart-healthy proteins include seafood, lean meats and poultry, eggs, beans, peas, nuts, seeds, and soy products. Limit drinks and foods with added sugar. These include candy, desserts, and soda pop. Lifestyle changes    If your doctor recommends it, get more exercise. Walking is a good choice. Bit by bit, increase the amount you walk every day. Try for at least 30 minutes on most days of the week. You also may want to swim, bike, or do other activities. Do not smoke. If you need help quitting, talk to your doctor about stop-smoking programs and medicines. These can increase your chances of quitting for good. Quitting smoking may be the most important step you can take to protect your heart. It is never too late to quit. Limit alcohol to 2 drinks a day for men and 1 drink a day for women. Too much alcohol can cause health problems. Manage other health problems such as diabetes, high blood pressure, and high cholesterol. If you think you may have a problem with alcohol or drug use, talk to your doctor. Medicines    Take your medicines exactly as prescribed. Call your doctor if you think you are having a problem with your medicine. If your doctor recommends aspirin, take the amount directed each day. Make sure you take aspirin and not another kind of pain reliever, such as acetaminophen (Tylenol). When should you call for help? Call 911 if you have symptoms of a heart attack. These may include:    Chest pain or pressure, or a strange feeling in the chest.     Sweating. Shortness of breath. Pain, pressure, or a strange feeling in the back, neck, jaw, or upper belly or in one or both shoulders or arms. Lightheadedness or sudden weakness.      A fast or irregular heartbeat. After you call 911, the  may tell you to chew 1 adult-strength or 2 to 4 low-dose aspirin. Wait for an ambulance. Do not try to drive yourself. Watch closely for changes in your health, and be sure to contact your doctor if you have any problems. Where can you learn more? Go to http://www.morris.com/  Enter F075 in the search box to learn more about \"A Healthy Heart: Care Instructions. \"  Current as of: January 10, 2022               Content Version: 13.4  © 6999-6754 [employer]. Care instructions adapted under license by [employer] (which disclaims liability or warranty for this information). If you have questions about a medical condition or this instruction, always ask your healthcare professional. [address] any warranty or liability for your use of this information. intermittent/aching/burning

## 2023-02-04 DIAGNOSIS — I25.5 ISCHEMIC CARDIOMYOPATHY: Primary | ICD-10-CM

## 2023-02-05 DIAGNOSIS — I25.5 ISCHEMIC CARDIOMYOPATHY: Primary | ICD-10-CM

## 2023-02-10 ENCOUNTER — HOSPITAL ENCOUNTER (OUTPATIENT)
Dept: LAB | Age: 58
Discharge: HOME | End: 2023-02-10
Payer: COMMERCIAL

## 2023-02-10 DIAGNOSIS — I25.5 ISCHEMIC CARDIOMYOPATHY: ICD-10-CM

## 2023-02-10 LAB
ALBUMIN SERPL-MCNC: 3.3 G/DL (ref 3.4–5)
ALBUMIN/GLOB SERPL: 0.9 (ref 0.8–1.7)
ALP SERPL-CCNC: 87 U/L (ref 45–117)
ALT SERPL-CCNC: 26 U/L (ref 13–56)
AST SERPL-CCNC: 18 U/L (ref 10–38)
BILIRUB DIRECT SERPL-MCNC: 0.1 MG/DL (ref 0–0.2)
BILIRUB SERPL-MCNC: 0.5 MG/DL (ref 0.2–1)
CHOLEST SERPL-MCNC: 95 MG/DL
GLOBULIN SER CALC-MCNC: 3.6 G/DL (ref 2–4)
HDLC SERPL-MCNC: 41 MG/DL (ref 40–60)
HDLC SERPL: 2.3 (ref 0–5)
LDLC SERPL CALC-MCNC: 35.6 MG/DL (ref 0–100)
LIPID PROFILE,FLP: NORMAL
PROT SERPL-MCNC: 6.9 G/DL (ref 6.4–8.2)
TRIGL SERPL-MCNC: 92 MG/DL (ref ?–150)
VLDLC SERPL CALC-MCNC: 18.4 MG/DL

## 2023-02-10 PROCEDURE — 80061 LIPID PANEL: CPT

## 2023-02-10 PROCEDURE — 36415 COLL VENOUS BLD VENIPUNCTURE: CPT

## 2023-02-10 PROCEDURE — 80076 HEPATIC FUNCTION PANEL: CPT

## 2023-03-07 RX ORDER — SACUBITRIL AND VALSARTAN 24; 26 MG/1; MG/1
1 TABLET, FILM COATED ORAL 2 TIMES DAILY
Qty: 180 TABLET | Refills: 1 | Status: SHIPPED | OUTPATIENT
Start: 2023-03-07

## 2023-03-09 ENCOUNTER — OFFICE VISIT (OUTPATIENT)
Age: 58
End: 2023-03-09
Payer: COMMERCIAL

## 2023-03-09 VITALS
DIASTOLIC BLOOD PRESSURE: 60 MMHG | SYSTOLIC BLOOD PRESSURE: 108 MMHG | HEART RATE: 60 BPM | WEIGHT: 215 LBS | BODY MASS INDEX: 36.7 KG/M2 | OXYGEN SATURATION: 97 % | HEIGHT: 64 IN

## 2023-03-09 DIAGNOSIS — Z95.5 HISTORY OF CORONARY ARTERY STENT PLACEMENT: ICD-10-CM

## 2023-03-09 DIAGNOSIS — E66.01 MORBID (SEVERE) OBESITY DUE TO EXCESS CALORIES (HCC): ICD-10-CM

## 2023-03-09 DIAGNOSIS — Z86.74 PERSONAL HISTORY OF SUDDEN CARDIAC ARREST: ICD-10-CM

## 2023-03-09 DIAGNOSIS — I25.5 ISCHEMIC CARDIOMYOPATHY: ICD-10-CM

## 2023-03-09 DIAGNOSIS — I25.118 ATHEROSCLEROSIS OF NATIVE CORONARY ARTERY OF NATIVE HEART WITH STABLE ANGINA PECTORIS (HCC): Primary | ICD-10-CM

## 2023-03-09 PROCEDURE — 99214 OFFICE O/P EST MOD 30 MIN: CPT | Performed by: INTERNAL MEDICINE

## 2023-03-09 RX ORDER — ASPIRIN 81 MG/1
162 TABLET, CHEWABLE ORAL DAILY
Qty: 200 TABLET | Status: SHIPPED | OUTPATIENT
Start: 2023-03-09

## 2023-03-09 ASSESSMENT — PATIENT HEALTH QUESTIONNAIRE - PHQ9
1. LITTLE INTEREST OR PLEASURE IN DOING THINGS: 0
SUM OF ALL RESPONSES TO PHQ QUESTIONS 1-9: 0
2. FEELING DOWN, DEPRESSED OR HOPELESS: 0
SUM OF ALL RESPONSES TO PHQ9 QUESTIONS 1 & 2: 0
SUM OF ALL RESPONSES TO PHQ QUESTIONS 1-9: 0
DEPRESSION UNABLE TO ASSESS: FUNCTIONAL CAPACITY MOTIVATION LIMITS ACCURACY

## 2023-03-09 ASSESSMENT — ENCOUNTER SYMPTOMS
RESPIRATORY NEGATIVE: 1
GASTROINTESTINAL NEGATIVE: 1
EYES NEGATIVE: 1

## 2023-03-09 NOTE — PROGRESS NOTES
1. Have you been to the ER, urgent care clinic since your last visit? Hospitalized since your last visit? No      2. Where do you normally have your labs drawn? [facility]    3. Do you need any refills today? No    4. Which local pharmacy do you use (enter pharmacy)? [facility]    5. Which mail order pharmacy do you use (enter pharmacy)? No     6. Are you here for surgical clearance and if so who will be doing your     procedure/surgery (care team)?    No

## 2023-03-09 NOTE — PROGRESS NOTES
Noy Art is a 56y.o. year old female. 3/2022  Patient seen following hospitalization for STEMI, and cardiac arrest.  She was taken emergently to Cath Lab and received stent to LAD. Echocardiogram revealed EF 35-40%. Since discharge she denies chest pain, shortness of breath or palpitations she has mild lower extremity edema for which she is wearing compression stockings. She has decreased smoking to 2 cigarettes/day and is attempting to quit with the aid of nicotine patches. 12/22 Patient denies significant chest pain, SOB, palpitations, edema, dizziness  3/9/2023 notices some mild edema in the ankles by the evenings which improves after overnight rest.  No significant chest pain, shortness of breath, dizziness, palpitations          Review of Systems   Constitutional: Negative. HENT: Negative. Eyes: Negative. Respiratory: Negative. Cardiovascular:  Positive for leg swelling. Gastrointestinal: Negative. Endocrine: Negative. Genitourinary: Negative. Musculoskeletal: Negative. Neurological: Negative. Psychiatric/Behavioral: Negative. All other systems reviewed and are negative. Physical Exam  Vitals and nursing note reviewed. Constitutional:       Appearance: Normal appearance. She is obese. HENT:      Head: Normocephalic and atraumatic. Nose: Nose normal.   Eyes:      Conjunctiva/sclera: Conjunctivae normal.   Cardiovascular:      Rate and Rhythm: Normal rate and regular rhythm. Pulses: Normal pulses. Heart sounds: Normal heart sounds. Pulmonary:      Effort: Pulmonary effort is normal.      Breath sounds: Normal breath sounds. Abdominal:      General: Bowel sounds are normal.      Palpations: Abdomen is soft. Musculoskeletal:         General: Normal range of motion. Right lower leg: No edema. Left lower leg: No edema. Skin:     General: Skin is warm and dry. Neurological:      General: No focal deficit present.       Mental Status: She is alert and oriented to person, place, and time.    Psychiatric:         Mood and Affect: Mood normal.         Behavior: Behavior normal.    No Known Allergies    Past Medical History:   Diagnosis Date    CAD in native artery     Hyperlipidemia     Hypertension     Obesity, Class III, BMI 40-49.9 (morbid obesity) ([facility] [address]        Family History   Problem Relation Age of Onset    Breast Cancer Maternal Grandmother     Breast Cancer Maternal Aunt     Cancer Father     Breast Cancer Father     Cancer Maternal Aunt     Breast Cancer Paternal Aunt        Social History     Tobacco Use    Smoking status: Former     Packs/day: 0.25     Types: Cigarettes     Quit date: 2022     Years since quittin.5    Smokeless tobacco: Never   Substance Use Topics    Alcohol use: Not Currently    Drug use: Yes     Frequency: 1.0 times per week     Types: Marijuana [facility] [employer])        Current Outpatient Medications   Medication Sig Dispense Refill    Multiple Vitamins-Minerals (MULTIVITAMIN ADULTS 50+ PO) Take 1 tablet by mouth daily      sacubitril-valsartan (ENTRESTO) 24-26 MG per tablet Take 1 tablet by mouth 2 times daily 180 tablet 1    allopurinol (ZYLOPRIM) 300 MG tablet Take 300 mg by mouth daily      aspirin 81 MG chewable tablet Take 81 mg by mouth daily      atorvastatin (LIPITOR) 80 MG tablet Take 80 mg by mouth      buPROPion (WELLBUTRIN XL) 150 MG extended release tablet Take 150 mg by mouth daily      carvedilol (COREG) 3.125 MG tablet Take 3.125 mg by mouth 2 times daily (with meals)      Cetirizine HCl (ZYRTEC ALLERGY) 10 MG CAPS Take by mouth      Clobetasol Propionate Emulsion 0.05 % FOAM ceived the following from [facility]: Outside name: clobetasoL-emollient no.65 0.05 % cmpk      empagliflozin (JARDIANCE) 25 MG tablet Take 25 mg by mouth daily      ezetimibe (ZETIA) 10 MG tablet Take 10 mg by mouth daily      fluticasone (FLONASE) 50 MCG/ACT nasal spray ceived the following from [facility]: Outside name: fluticasone propionate (FLONASE) 50 mcg/actuation nasal spray      levothyroxine (SYNTHROID) 125 MCG tablet Take 125 mcg by mouth every morning (before breakfast)      omeprazole (PRILOSEC) 10 MG delayed release capsule Take 10 mg by mouth as needed      pramipexole (MIRAPEX) 0.125 MG tablet ceived the following from [facility]: Outside name: pramipexole (MIRAPEX) 0.125 mg tablet      ticagrelor (BRILINTA) 90 MG TABS tablet Take 90 mg by mouth in the morning and 90 mg in the evening. No current facility-administered medications for this visit. Past Surgical History:   Procedure Laterality Date    CORONARY ANGIOPLASTY WITH STENT PLACEMENT      CA UNLISTED PROCEDURE CARDIAC SURGERY         Vitals:    03/09/23 0831   BP: 108/60   Site: Left Upper Arm   Position: Sitting   Cuff Size: Medium Adult   Pulse: 60   SpO2: 97%   Weight: 215 lb (97.5 kg)   Height: 5' 4\" (1.626 m)          Diagnostic Studies:  I have reviewed the relevant tests done on the patient and show as follows  EKG tracings reviewed by me today. Ms. Noel Mckeon has a reminder for a \"due or due soon\" health maintenance. I have asked that she contact her primary care provider for follow-up on this health maintenance. 3/2022   Cardiac cath  Conclusion       Acute ST elevation MI from totally occluded previously deployed mid LAD stent. 50% mid circumflex lesion and diffuse luminal irregularities throughout coronary vasculature. Successful PCI of the mid LAD deploying a drug-eluting stent without any complications. Stenting done within the previously deployed stent but involves a longer segment today. Severely elevated LVEDP of 29 mmHg. Severely reduced ejection fraction in the range of 30 to 35% by visual estimate and wall motion abnormalities as described.   Significant ventricular ectopy including ventricular bigeminy seen as the artery opened up requiring 1 dose of IV amiodarone. Procedure done via right femoral artery without any complications and vascular closure by Angio-Seal.     Medical treatment and risk factor modification. May be a candidate for primary prophylaxis with ICD in future if LV function function does not recover. Compliance with medications and risk factor modification to be stressed during the hospital stay. Discussed with family and have already stressed to them the importance of medications and risk factors. Echo    3/2022  Interpretation Summary         Left Ventricle: Left ventricle size is normal. Mildly increased wall thickness. See diagram for wall motion findings. Moderately reduced left ventricular systolic function with a visually estimated EF of 35 - 40%. Grade II diastolic dysfunction with increased LAP.    02/16/23    TRANSTHORACIC ECHOCARDIOGRAM (TTE) COMPLETE (CONTRAST/BUBBLE/3D PRN) 02/16/2023 11:02 AM, 02/16/2023 12:00 AM (Final)    Interpretation Summary    Left Ventricle: Low normal left ventricular systolic function with a visually estimated EF of 55 - 60%. Left ventricle size is normal. Normal wall thickness. See diagram for wall motion findings. Diastolic dysfunction present with normal LV EF. Technical qualifiers: Color flow Doppler was performed and pulse wave and/or continuous wave Doppler was performed.     Signed by: Anne-Marie Mejia MD on 2/16/2023 11:02 AM, Signed by: Unknown Provider Result on 2/16/2023 12:00 AM      ASSESSMENT and PLAN     Latest Reference Range & Units 3/5/22 17:58 3/6/22 02:29 7/22/22 07:43   Triglyceride <150 MG/ 236 (H) 121   Cholesterol, total <200 MG/ (H) 264 (H) 138   HDL Cholesterol 40 - 60 MG/DL 35 (L) 33 (L) 37 (L)   CHOL/HDL Ratio 0 - 5.0   8.3 (H) 8.0 (H) 3.7   LDL, calculated 0 - 100 MG/.2 (H) 183.8 (H) 76.8   VLDL, calculated MG/DL 24.8 47.2 24.2   (H): Data is abnormally high  (L): Data is abnormally low   Latest Reference Range & Units 7/22/22 07:43 2/10/23 07:57 Chol/HDL Ratio 0 - 5.0   3.7 2.3   CHOLESTEROL, TOTAL, 706526 <200 MG/ 95   HDL Cholesterol 40 - 60 MG/DL 37 (L) 41   LDL Calculated 0 - 100 MG/DL 76.8 35.6   Triglycerides <150 MG/ 92   VLDL Cholesterol Calculated MG/DL 24.2 18.4   (L): Data is abnormally low    3/2022  Patient seen status post STEMI/cardiac arrest status post SALBADOR to LAD. Cardiac cath reviewed and discussed with patient LAD open to 0% stenosis, mid circumflex with 50% stenosis. Echocardiogram reviewed and discussed with patient EF 35-40% no valvular pathology noted, continue optimal medication of carvedilol with lisinopril. Unable to uptitrate at this time due to low blood pressure. Continue aspirin, Brilinta and statin. Plan to repeat fasting lipids in 4 to 6 weeks. Stressed importance of continued DAPT. Discussed cardiac rehab, patient would like to think about this and let us know if she would like to attend. May return to work as  no lifting, pushing pulling greater than 25 pounds. 7/13/2022 CAD stable. Has ischemic cardiomyopathy due to previous MI. Change lisinopril to Entresto. Add Jardiance. She is reducing smoking for which she was congratulated and plans to stop it this month. She has not been able to lose weight. Discussed and given Mediterranean diet guidelines. Check labs after switching the medications including lipids. 12/12/2022 CAD is stable without any significant symptoms. No CHF with ischemic cardiomyopathy. Check echo as she is on max tolerated medications to decide on ICD. Lipids have dropped fairly well. But add Zetia to see if we can achieve further lowering of LDL. Follow labs. Detailed discussion of diet weight and exercise. She has stopped smoking and is trying to exercise at least 3 days a week for which she was congratulated. She had gained some weight which is now downtrending which is also great. Kim Ibarra was seen today for follow-up.     Diagnoses and all orders for this visit:    Atherosclerosis of native coronary artery of native heart with stable angina pectoris ([facility] [address]    Ischemic cardiomyopathy    Morbid (severe) obesity due to excess calories ([facility] [address]    History of coronary artery stent placement    Personal history of sudden cardiac arrest    Other orders  -     aspirin 81 MG chewable tablet; Take 2 tablets by mouth daily        Pertinent laboratory and test data reviewed and discussed with patient. See patient instructions also for other medical advice given    Medications Discontinued During This Encounter   Medication Reason    ticagrelor (BRILINTA) 90 MG TABS tablet Therapy completed    aspirin 81 MG chewable tablet REORDER           Return to ER if any significant CP not relieved by s/l NTG, severe SOB, severe palpitations, loss of consciousness    3/9/2023 CAD stable. Discontinue Brilinta and increase maintenance aspirin to 162 mg a day. Ejection fraction has improved very well and lipids are excellent. She has not been able to lose much weight and will try. Mediterranean diet discussed and printed again. Encouraged to follow diet and weight loss. She does have fairly good exercise as she is able to do 10-15,000 steps a day. 0 = understands/communicates without difficulty

## 2023-03-20 RX ORDER — CARVEDILOL 3.12 MG/1
TABLET ORAL
Qty: 180 TABLET | Refills: 2 | Status: SHIPPED | OUTPATIENT
Start: 2023-03-20

## 2023-03-23 RX ORDER — ATORVASTATIN CALCIUM 80 MG/1
TABLET, FILM COATED ORAL
Qty: 90 TABLET | Refills: 0 | Status: SHIPPED | OUTPATIENT
Start: 2023-03-23

## 2023-03-27 RX ORDER — EZETIMIBE 10 MG/1
TABLET ORAL
Qty: 90 TABLET | Refills: 1 | Status: SHIPPED | OUTPATIENT
Start: 2023-03-27

## 2023-06-27 RX ORDER — ATORVASTATIN CALCIUM 80 MG/1
80 TABLET, FILM COATED ORAL NIGHTLY
Qty: 90 TABLET | Refills: 0 | Status: SHIPPED | OUTPATIENT
Start: 2023-06-27

## 2023-07-20 ENCOUNTER — HOSPITAL ENCOUNTER (OUTPATIENT)
Facility: HOSPITAL | Age: 58
Discharge: HOME | End: 2023-07-20
Payer: COMMERCIAL

## 2023-07-20 DIAGNOSIS — Z12.31 VISIT FOR SCREENING MAMMOGRAM: ICD-10-CM

## 2023-07-20 PROCEDURE — 77063 BREAST TOMOSYNTHESIS BI: CPT

## 2023-09-07 RX ORDER — SACUBITRIL AND VALSARTAN 24; 26 MG/1; MG/1
TABLET, FILM COATED ORAL
Qty: 180 TABLET | Refills: 1 | Status: SHIPPED | OUTPATIENT
Start: 2023-09-07

## 2023-09-25 RX ORDER — ATORVASTATIN CALCIUM 80 MG/1
80 TABLET, FILM COATED ORAL
Qty: 90 TABLET | Refills: 0 | Status: SHIPPED | OUTPATIENT
Start: 2023-09-25

## 2023-10-09 RX ORDER — EZETIMIBE 10 MG/1
10 TABLET ORAL DAILY
Qty: 90 TABLET | Refills: 1 | Status: CANCELLED | OUTPATIENT
Start: 2023-10-09

## 2023-10-11 RX ORDER — EZETIMIBE 10 MG/1
10 TABLET ORAL DAILY
Qty: 90 TABLET | Refills: 1 | Status: SHIPPED | OUTPATIENT
Start: 2023-10-11

## 2023-11-17 ENCOUNTER — OFFICE VISIT (OUTPATIENT)
Age: 58
End: 2023-11-17
Payer: COMMERCIAL

## 2023-11-17 VITALS
SYSTOLIC BLOOD PRESSURE: 103 MMHG | WEIGHT: 209.6 LBS | HEIGHT: 64 IN | BODY MASS INDEX: 35.78 KG/M2 | DIASTOLIC BLOOD PRESSURE: 59 MMHG | HEART RATE: 65 BPM

## 2023-11-17 DIAGNOSIS — E66.01 SEVERE OBESITY (BMI 35.0-39.9) WITH COMORBIDITY (HCC): ICD-10-CM

## 2023-11-17 DIAGNOSIS — I25.118 ATHEROSCLEROSIS OF NATIVE CORONARY ARTERY OF NATIVE HEART WITH STABLE ANGINA PECTORIS (HCC): ICD-10-CM

## 2023-11-17 DIAGNOSIS — Z86.74 PERSONAL HISTORY OF SUDDEN CARDIAC ARREST: ICD-10-CM

## 2023-11-17 DIAGNOSIS — I25.5 ISCHEMIC CARDIOMYOPATHY: Primary | ICD-10-CM

## 2023-11-17 DIAGNOSIS — R55 NEAR SYNCOPE: ICD-10-CM

## 2023-11-17 PROCEDURE — 99214 OFFICE O/P EST MOD 30 MIN: CPT | Performed by: INTERNAL MEDICINE

## 2023-11-17 PROCEDURE — 93000 ELECTROCARDIOGRAM COMPLETE: CPT | Performed by: INTERNAL MEDICINE

## 2023-11-17 RX ORDER — MELOXICAM 15 MG/1
15 TABLET ORAL AS NEEDED
COMMUNITY

## 2023-11-17 RX ORDER — MONTELUKAST SODIUM 10 MG/1
TABLET ORAL
COMMUNITY
Start: 2023-10-13

## 2023-11-17 ASSESSMENT — ENCOUNTER SYMPTOMS
GASTROINTESTINAL NEGATIVE: 1
RESPIRATORY NEGATIVE: 1
EYES NEGATIVE: 1

## 2023-11-17 NOTE — PROGRESS NOTES
Room 6    1. Have you been to the ER, urgent care clinic since your last visit? Hospitalized since your last visit?     no      2. Where do you normally have your labs drawn?     [facility]  3. Do you need any refills today?   no    4. Which local pharmacy do you use (enter pharmacy)? [facility]     5. Which mail order pharmacy do you use (enter pharmacy)? 6. Are you here for surgical clearance and if so who will be doing your     procedure/surgery (care team)?

## 2023-11-17 NOTE — PROGRESS NOTES
Serene Bustillos is a 66y.o. year old female. 3/2022  Patient seen following hospitalization for STEMI, and cardiac arrest.  She was taken emergently to Cath Lab and received stent to LAD. Echocardiogram revealed EF 35-40%. Since discharge she denies chest pain, shortness of breath or palpitations she has mild lower extremity edema for which she is wearing compression stockings. She has decreased smoking to 2 cigarettes/day and is attempting to quit with the aid of nicotine patches. 12/22 Patient denies significant chest pain, SOB, palpitations, edema, dizziness  3/9/2023 notices some mild edema in the ankles by the evenings which improves after overnight rest.  No significant chest pain, shortness of breath, dizziness, palpitations  11/17/2023 feels a lot better since she has made some dietary changes and is now following Noom protocol. Occasional edema is still present. Denies any chest pain shortness of breath or palpitations. I had an episode of near syncope at home on 10/30/2023 which lasted almost an hour. Denies any palpitations during that. Had been making dietary changes just before that. Review of Systems   Constitutional: Negative. HENT: Negative. Eyes: Negative. Respiratory: Negative. Cardiovascular:  Positive for leg swelling. Gastrointestinal: Negative. Endocrine: Negative. Genitourinary: Negative. Musculoskeletal: Negative. Neurological:  Positive for dizziness. Psychiatric/Behavioral: Negative. All other systems reviewed and are negative. Physical Exam  Vitals and nursing note reviewed. Constitutional:       Appearance: Normal appearance. She is obese. HENT:      Head: Normocephalic and atraumatic. Nose: Nose normal.   Eyes:      Conjunctiva/sclera: Conjunctivae normal.   Cardiovascular:      Rate and Rhythm: Normal rate and regular rhythm. Pulses: Normal pulses. Heart sounds: Normal heart sounds.    Pulmonary:

## 2023-11-24 ENCOUNTER — HOSPITAL ENCOUNTER (OUTPATIENT)
Facility: HOSPITAL | Age: 58
Discharge: HOME | End: 2023-11-24
Payer: COMMERCIAL

## 2023-11-24 DIAGNOSIS — I25.118 ATHEROSCLEROSIS OF NATIVE CORONARY ARTERY OF NATIVE HEART WITH STABLE ANGINA PECTORIS (HCC): ICD-10-CM

## 2023-11-24 DIAGNOSIS — R55 NEAR SYNCOPE: ICD-10-CM

## 2023-11-24 LAB
ANION GAP SERPL CALC-SCNC: 4 MMOL/L (ref 3–18)
BUN SERPL-MCNC: 16 MG/DL (ref 7–18)
BUN/CREAT SERPL: 14 (ref 12–20)
CALCIUM SERPL-MCNC: 9.9 MG/DL (ref 8.5–10.1)
CHLORIDE SERPL-SCNC: 106 MMOL/L (ref 100–111)
CO2 SERPL-SCNC: 32 MMOL/L (ref 21–32)
CREAT SERPL-MCNC: 1.13 MG/DL (ref 0.6–1.3)
GLUCOSE SERPL-MCNC: 115 MG/DL (ref 74–99)
MAGNESIUM SERPL-MCNC: 2.1 MG/DL (ref 1.6–2.6)
POTASSIUM SERPL-SCNC: 4.5 MMOL/L (ref 3.5–5.5)
SODIUM SERPL-SCNC: 142 MMOL/L (ref 136–145)

## 2023-11-24 PROCEDURE — 83735 ASSAY OF MAGNESIUM: CPT

## 2023-11-24 PROCEDURE — 36415 COLL VENOUS BLD VENIPUNCTURE: CPT

## 2023-11-24 PROCEDURE — 80048 BASIC METABOLIC PNL TOTAL CA: CPT

## 2023-12-07 ENCOUNTER — TELEPHONE (OUTPATIENT)
Age: 58
End: 2023-12-07

## 2023-12-07 NOTE — TELEPHONE ENCOUNTER
Patient was notified regarding blood pressure readings from 12/7. Dr. Liv Pizano wanted to know if she was dizzy. Patient stated stated she was a little dizzy on 12/6 at night around 8pm. She stated she did not record her blood pressure she can remember it being 87/44. She stated she only get dizzy at night. She stated she take her entresto 24-26 mg daily and her carvedilol 3.125 bid.

## 2023-12-12 NOTE — TELEPHONE ENCOUNTER
Patient called and notified Per Dr. Jung Leach Please tell her to take Entresto once a day in the morning for now and as her blood pressure will stabilize, we can try twice a day.   Please get her home BP and heart rate chart for 3 to 4 days  Patient stated she understood instructions

## 2023-12-12 NOTE — TELEPHONE ENCOUNTER
Please tell her to take Entresto once a day in the morning for now and as her blood pressure will stabilize, we can try twice a day.   Please get her home BP and heart rate chart for 3 to 4 days

## 2023-12-26 ENCOUNTER — OFFICE VISIT (OUTPATIENT)
Age: 58
End: 2023-12-26
Payer: COMMERCIAL

## 2023-12-26 VITALS
SYSTOLIC BLOOD PRESSURE: 99 MMHG | DIASTOLIC BLOOD PRESSURE: 64 MMHG | HEIGHT: 64 IN | OXYGEN SATURATION: 99 % | BODY MASS INDEX: 34.83 KG/M2 | WEIGHT: 204 LBS | HEART RATE: 55 BPM

## 2023-12-26 DIAGNOSIS — E66.01 SEVERE OBESITY (BMI 35.0-39.9) WITH COMORBIDITY (HCC): ICD-10-CM

## 2023-12-26 DIAGNOSIS — E78.2 MIXED HYPERLIPIDEMIA: ICD-10-CM

## 2023-12-26 DIAGNOSIS — Z95.5 HISTORY OF CORONARY ARTERY STENT PLACEMENT: ICD-10-CM

## 2023-12-26 DIAGNOSIS — Z86.74 HISTORY OF CARDIAC ARREST: ICD-10-CM

## 2023-12-26 DIAGNOSIS — I25.2 HISTORY OF ST ELEVATION MYOCARDIAL INFARCTION (STEMI): ICD-10-CM

## 2023-12-26 DIAGNOSIS — I25.5 ISCHEMIC CARDIOMYOPATHY: Primary | ICD-10-CM

## 2023-12-26 PROCEDURE — 99214 OFFICE O/P EST MOD 30 MIN: CPT | Performed by: INTERNAL MEDICINE

## 2023-12-26 RX ORDER — ATORVASTATIN CALCIUM 80 MG/1
80 TABLET, FILM COATED ORAL DAILY
Qty: 90 TABLET | Refills: 2 | Status: SHIPPED | OUTPATIENT
Start: 2023-12-26

## 2023-12-26 RX ORDER — CARVEDILOL 3.12 MG/1
3.12 TABLET ORAL 2 TIMES DAILY
Qty: 180 TABLET | Refills: 2 | Status: SHIPPED | OUTPATIENT
Start: 2023-12-26

## 2023-12-26 NOTE — PROGRESS NOTES
Medications verbally reviewed. 1. Have you been to the ER, urgent care clinic since your last visit? Hospitalized since your last visit? No    2. Where do you normally have your labs drawn? [facility]      3. Do you need any refills today? Yes      4. Which local pharmacy do you use? [facility] [address]      5. Which mail order pharmacy do you use? None       6. Are you here for surgical clearance? No, who will be doing your procedure/surgery (care team)?    N/A

## 2023-12-26 NOTE — PROGRESS NOTES
Catherine Monte is a 66y.o. year old female. 3/2022  Patient seen following hospitalization for STEMI, and cardiac arrest.  She was taken emergently to Cath Lab and received stent to LAD. Echocardiogram revealed EF 35-40%. Since discharge she denies chest pain, shortness of breath or palpitations she has mild lower extremity edema for which she is wearing compression stockings. She has decreased smoking to 2 cigarettes/day and is attempting to quit with the aid of nicotine patches. 12/22 Patient denies significant chest pain, SOB, palpitations, edema, dizziness  3/9/2023 notices some mild edema in the ankles by the evenings which improves after overnight rest.  No significant chest pain, shortness of breath, dizziness, palpitations  11/17/2023 feels a lot better since she has made some dietary changes and is now following Noom protocol. Occasional edema is still present. Denies any chest pain shortness of breath or palpitations. I had an episode of near syncope at home on 10/30/2023 which lasted almost an hour. Denies any palpitations during that. Had been making dietary changes just before that.  12/26/2023           Review of Systems   Constitutional: Negative. HENT: Negative. Eyes: Negative. Respiratory: Negative. Cardiovascular:  Positive for leg swelling. Gastrointestinal: Negative. Endocrine: Negative. Genitourinary: Negative. Musculoskeletal: Negative. Neurological:  Positive for dizziness. Psychiatric/Behavioral: Negative. All other systems reviewed and are negative. Physical Exam  Vitals and nursing note reviewed. Constitutional:       Appearance: Normal appearance. She is obese. HENT:      Head: Normocephalic and atraumatic. Nose: Nose normal.   Eyes:      Conjunctiva/sclera: Conjunctivae normal.   Cardiovascular:      Rate and Rhythm: Normal rate and regular rhythm. Pulses: Normal pulses. Heart sounds: Normal heart sounds.

## 2024-03-13 ENCOUNTER — HOSPITAL ENCOUNTER (OUTPATIENT)
Facility: HOSPITAL | Age: 59
Discharge: HOME | End: 2024-03-16
Payer: COMMERCIAL

## 2024-03-13 DIAGNOSIS — I25.5 ISCHEMIC CARDIOMYOPATHY: ICD-10-CM

## 2024-03-13 DIAGNOSIS — E78.2 MIXED HYPERLIPIDEMIA: ICD-10-CM

## 2024-03-13 LAB
ALBUMIN SERPL-MCNC: 3.9 G/DL (ref 3.4–5)
ALBUMIN/GLOB SERPL: 1.1 (ref 0.8–1.7)
ALP SERPL-CCNC: 82 U/L (ref 45–117)
ALT SERPL-CCNC: 36 U/L (ref 13–56)
ANION GAP SERPL CALC-SCNC: 7 MMOL/L (ref 3–18)
AST SERPL-CCNC: 21 U/L (ref 10–38)
BILIRUB DIRECT SERPL-MCNC: 0.2 MG/DL (ref 0–0.2)
BILIRUB SERPL-MCNC: 0.7 MG/DL (ref 0.2–1)
BUN SERPL-MCNC: 19 MG/DL (ref 7–18)
BUN/CREAT SERPL: 18 (ref 12–20)
CALCIUM SERPL-MCNC: 9.8 MG/DL (ref 8.5–10.1)
CHLORIDE SERPL-SCNC: 107 MMOL/L (ref 100–111)
CHOLEST SERPL-MCNC: 145 MG/DL
CO2 SERPL-SCNC: 30 MMOL/L (ref 21–32)
CREAT SERPL-MCNC: 1.03 MG/DL (ref 0.6–1.3)
GLOBULIN SER CALC-MCNC: 3.6 G/DL (ref 2–4)
GLUCOSE SERPL-MCNC: 96 MG/DL (ref 74–99)
HDLC SERPL-MCNC: 41 MG/DL (ref 40–60)
HDLC SERPL: 3.5 (ref 0–5)
LDLC SERPL CALC-MCNC: 67.8 MG/DL (ref 0–100)
LIPID PANEL: ABNORMAL
POTASSIUM SERPL-SCNC: 5.1 MMOL/L (ref 3.5–5.5)
PROT SERPL-MCNC: 7.5 G/DL (ref 6.4–8.2)
SODIUM SERPL-SCNC: 144 MMOL/L (ref 136–145)
TRIGL SERPL-MCNC: 181 MG/DL
VLDLC SERPL CALC-MCNC: 36.2 MG/DL

## 2024-03-13 PROCEDURE — 80076 HEPATIC FUNCTION PANEL: CPT

## 2024-03-13 PROCEDURE — 36415 COLL VENOUS BLD VENIPUNCTURE: CPT

## 2024-03-13 PROCEDURE — 80061 LIPID PANEL: CPT

## 2024-03-13 PROCEDURE — 80048 BASIC METABOLIC PNL TOTAL CA: CPT

## 2024-04-01 ENCOUNTER — OFFICE VISIT (OUTPATIENT)
Age: 59
End: 2024-04-01
Payer: COMMERCIAL

## 2024-04-01 VITALS
BODY MASS INDEX: 32.1 KG/M2 | SYSTOLIC BLOOD PRESSURE: 106 MMHG | DIASTOLIC BLOOD PRESSURE: 70 MMHG | WEIGHT: 187 LBS | OXYGEN SATURATION: 97 % | HEART RATE: 56 BPM

## 2024-04-01 DIAGNOSIS — Z95.5 HISTORY OF CORONARY ARTERY STENT PLACEMENT: ICD-10-CM

## 2024-04-01 DIAGNOSIS — Z86.74 HISTORY OF CARDIAC ARREST: ICD-10-CM

## 2024-04-01 DIAGNOSIS — I25.5 ISCHEMIC CARDIOMYOPATHY: ICD-10-CM

## 2024-04-01 DIAGNOSIS — E78.2 MIXED HYPERLIPIDEMIA: ICD-10-CM

## 2024-04-01 DIAGNOSIS — I25.10 CORONARY ARTERY DISEASE INVOLVING NATIVE CORONARY ARTERY OF NATIVE HEART WITHOUT ANGINA PECTORIS: Primary | ICD-10-CM

## 2024-04-01 DIAGNOSIS — E03.8 HYPOTHYROIDISM DUE TO HASHIMOTO'S THYROIDITIS: ICD-10-CM

## 2024-04-01 DIAGNOSIS — E06.3 HYPOTHYROIDISM DUE TO HASHIMOTO'S THYROIDITIS: ICD-10-CM

## 2024-04-01 PROCEDURE — 99214 OFFICE O/P EST MOD 30 MIN: CPT | Performed by: INTERNAL MEDICINE

## 2024-04-01 RX ORDER — EZETIMIBE 10 MG/1
10 TABLET ORAL DAILY
Qty: 90 TABLET | Refills: 3 | Status: SHIPPED | OUTPATIENT
Start: 2024-04-01

## 2024-04-01 ASSESSMENT — ENCOUNTER SYMPTOMS
RESPIRATORY NEGATIVE: 1
EYES NEGATIVE: 1
GASTROINTESTINAL NEGATIVE: 1

## 2024-04-01 NOTE — PROGRESS NOTES
1. Have you been to the ER, urgent care clinic since your last visit?  Hospitalized since your last visit? NO    2.  Where do you normally have your labs drawn?  [facility]    3. Do you need any refills today?   NO    4. Which local pharmacy do you use (enter pharmacy)?   [facility]    5. Which mail order pharmacy do you use (enter pharmacy)?   NO     6. Are you here for surgical clearance and if so who will be doing your     procedure/surgery (care team)?   NO      
pounds.    7/13/2022 CAD stable.  Has ischemic cardiomyopathy due to previous MI.  Change lisinopril to Entresto.  Add Jardiance.  She is reducing smoking for which she was congratulated and plans to stop it this month.  She has not been able to lose weight.  Discussed and given Mediterranean diet guidelines.  Check labs after switching the medications including lipids.      12/12/2022 CAD is stable without any significant symptoms.  No CHF with ischemic cardiomyopathy.  Check echo as she is on max tolerated medications to decide on ICD.  Lipids have dropped fairly well.  But add Zetia to see if we can achieve further lowering of LDL.  Follow labs.  Detailed discussion of diet weight and exercise.  She has stopped smoking and is trying to exercise at least 3 days a week for which she was congratulated.  She had gained some weight which is now downtrending which is also great.    3/9/2023 CAD stable.  Discontinue Brilinta and increase maintenance aspirin to 162 mg a day.  Ejection fraction has improved very well and lipids are excellent.  She has not been able to lose much weight and will try.  Mediterranean diet discussed and printed again.  Encouraged to follow diet and weight loss.  She does have fairly good exercise capacity as she is able to do 10-15,000 steps a day.    11/17/2023  Plan for medicines : CAD is clinically stable.  Continue aspirin statin.  Lipids are excellent.  May discontinue Zetia and follow the lipids in future.  Would aim to keep LDL in 50s.  Near syncope is concerning.  Check electrolytes including magnesium, 30-day event monitor.  Echo done earlier this year she had shown normal ejection fraction.  Check home BP chart and if BP is trending low, may reduce or discontinue Entresto as her ejection fraction has normalized.  Exercise Plan: Continue to exercise as now on a regular basis.  Diet plan: Mediterranean diet guidelines reinforced.  She has started following the diet more strictly and is

## 2024-05-13 RX ORDER — SACUBITRIL AND VALSARTAN 24; 26 MG/1; MG/1
1 TABLET, FILM COATED ORAL 2 TIMES DAILY
Qty: 180 TABLET | Refills: 1 | Status: SHIPPED | OUTPATIENT
Start: 2024-05-13

## 2024-08-15 ENCOUNTER — HOSPITAL ENCOUNTER (OUTPATIENT)
Facility: HOSPITAL | Age: 59
Discharge: HOME | End: 2024-08-15
Payer: COMMERCIAL

## 2024-08-15 VITALS — WEIGHT: 187 LBS | BODY MASS INDEX: 31.16 KG/M2 | HEIGHT: 65 IN

## 2024-08-15 DIAGNOSIS — Z12.31 VISIT FOR SCREENING MAMMOGRAM: ICD-10-CM

## 2024-08-15 PROCEDURE — 77063 BREAST TOMOSYNTHESIS BI: CPT

## 2024-09-26 DIAGNOSIS — I25.5 ISCHEMIC CARDIOMYOPATHY: ICD-10-CM

## 2024-09-26 RX ORDER — CARVEDILOL 3.12 MG/1
3.12 TABLET ORAL 2 TIMES DAILY
Qty: 180 TABLET | Refills: 2 | Status: SHIPPED | OUTPATIENT
Start: 2024-09-26

## 2024-09-26 RX ORDER — ATORVASTATIN CALCIUM 80 MG/1
80 TABLET, FILM COATED ORAL DAILY
Qty: 90 TABLET | Refills: 2 | Status: SHIPPED | OUTPATIENT
Start: 2024-09-26

## 2024-09-27 ENCOUNTER — HOSPITAL ENCOUNTER (OUTPATIENT)
Facility: HOSPITAL | Age: 59
Discharge: HOME | End: 2024-09-30
Payer: COMMERCIAL

## 2024-09-27 DIAGNOSIS — E78.2 MIXED HYPERLIPIDEMIA: ICD-10-CM

## 2024-09-27 LAB
ALBUMIN SERPL-MCNC: 3.5 G/DL (ref 3.4–5)
ALBUMIN/GLOB SERPL: 1 (ref 0.8–1.7)
ALP SERPL-CCNC: 73 U/L (ref 45–117)
ALT SERPL-CCNC: 27 U/L (ref 13–56)
AST SERPL-CCNC: 17 U/L (ref 10–38)
BILIRUB DIRECT SERPL-MCNC: 0.1 MG/DL (ref 0–0.2)
BILIRUB SERPL-MCNC: 0.5 MG/DL (ref 0.2–1)
CHOLEST SERPL-MCNC: 124 MG/DL
GLOBULIN SER CALC-MCNC: 3.4 G/DL (ref 2–4)
HDLC SERPL-MCNC: 41 MG/DL (ref 40–60)
HDLC SERPL: 3 (ref 0–5)
LDLC SERPL CALC-MCNC: 57.6 MG/DL (ref 0–100)
LIPID PANEL: NORMAL
PROT SERPL-MCNC: 6.9 G/DL (ref 6.4–8.2)
TRIGL SERPL-MCNC: 127 MG/DL
VLDLC SERPL CALC-MCNC: 25.4 MG/DL

## 2024-09-27 PROCEDURE — 80076 HEPATIC FUNCTION PANEL: CPT

## 2024-09-27 PROCEDURE — 36415 COLL VENOUS BLD VENIPUNCTURE: CPT

## 2024-09-27 PROCEDURE — 80061 LIPID PANEL: CPT

## 2024-11-11 RX ORDER — SACUBITRIL AND VALSARTAN 24; 26 MG/1; MG/1
1 TABLET, FILM COATED ORAL 2 TIMES DAILY
Qty: 180 TABLET | Refills: 3 | Status: SHIPPED | OUTPATIENT
Start: 2024-11-11

## 2025-06-28 DIAGNOSIS — I25.5 ISCHEMIC CARDIOMYOPATHY: ICD-10-CM

## 2025-06-30 RX ORDER — ATORVASTATIN CALCIUM 80 MG/1
80 TABLET, FILM COATED ORAL DAILY
Qty: 90 TABLET | Refills: 2 | OUTPATIENT
Start: 2025-06-30

## 2025-06-30 RX ORDER — CARVEDILOL 3.12 MG/1
3.12 TABLET ORAL 2 TIMES DAILY
Qty: 180 TABLET | Refills: 2 | OUTPATIENT
Start: 2025-06-30

## 2025-07-15 ENCOUNTER — TRANSCRIBE ORDERS (OUTPATIENT)
Facility: HOSPITAL | Age: 60
End: 2025-07-15

## 2025-07-15 DIAGNOSIS — Z12.31 ENCOUNTER FOR SCREENING MAMMOGRAM FOR MALIGNANT NEOPLASM OF BREAST: Primary | ICD-10-CM

## 2025-07-15 DIAGNOSIS — I25.5 ISCHEMIC CARDIOMYOPATHY: ICD-10-CM

## 2025-07-15 RX ORDER — CARVEDILOL 3.12 MG/1
3.12 TABLET ORAL 2 TIMES DAILY
Qty: 180 TABLET | Refills: 2 | OUTPATIENT
Start: 2025-07-15

## 2025-07-15 RX ORDER — ATORVASTATIN CALCIUM 80 MG/1
80 TABLET, FILM COATED ORAL DAILY
Qty: 90 TABLET | Refills: 2 | OUTPATIENT
Start: 2025-07-15

## 2025-07-20 DIAGNOSIS — I25.5 ISCHEMIC CARDIOMYOPATHY: ICD-10-CM

## 2025-07-20 DIAGNOSIS — E78.2 MIXED HYPERLIPIDEMIA: ICD-10-CM

## 2025-07-21 RX ORDER — ATORVASTATIN CALCIUM 80 MG/1
80 TABLET, FILM COATED ORAL DAILY
Qty: 90 TABLET | Refills: 2 | OUTPATIENT
Start: 2025-07-21

## 2025-07-21 RX ORDER — EZETIMIBE 10 MG/1
10 TABLET ORAL DAILY
Qty: 90 TABLET | Refills: 3 | OUTPATIENT
Start: 2025-07-21

## 2025-07-21 RX ORDER — CARVEDILOL 3.12 MG/1
3.12 TABLET ORAL 2 TIMES DAILY
Qty: 180 TABLET | Refills: 2 | OUTPATIENT
Start: 2025-07-21

## (undated) DEVICE — COVER US PRB W15XL120CM W/ GEL RUBBERBAND TAPE STRP FLD GEN

## (undated) DEVICE — CATHETER ANGIO 4FR L100CM COR NYL AR MOD W/O SIDE H RADPQ

## (undated) DEVICE — SET FLD ADMIN 3 W STPCOCK FIX FEM L BOR 1IN

## (undated) DEVICE — CATHETER GUID 6FR L100CM GRN PTFE XBLAD3.5 TRUELUMEN HYBRID

## (undated) DEVICE — CATH ANGI BLLN DIL 3.25X12MM -- NC EUPHORA

## (undated) DEVICE — CATHETER ANGIO 5FR L100CM GRY S STL NYL JR4 3 SEG BRAID L

## (undated) DEVICE — INTRODUCER SHTH 6FR CANN L11CM DIL TIP 35MM GRN TUNGSTEN

## (undated) DEVICE — CATH ANGI BLLN DIL 3.25X20MM -- NC EUPHORA

## (undated) DEVICE — PRESSURE MONITORING SET: Brand: TRUWAVE

## (undated) DEVICE — CATH BLLN DIL 2.5 X12MM RX -- EUPHORA

## (undated) DEVICE — CATH ANGI BLLN DIL 3.5X12MM -- NC EUPHORA

## (undated) DEVICE — Z DUPLICATE USE 2103554 VALVE HEMOSTATIC BLEEDBK CTRL COPILOT

## (undated) DEVICE — DEVICE INFL W ACCS + HEMSTAS VLV INSRT TOOL AND TORQ BASIX

## (undated) DEVICE — CATHETER ANGIO JL4 0.045 INX5 FRX100 CM THRULUMEN EXPO

## (undated) DEVICE — ANGIO-SEAL VIP VASCULAR CLOSURE DEVICE: Brand: ANGIO-SEAL

## (undated) DEVICE — RUNTHROUGH NS EXTRA FLOPPY PTCA GUIDEWIRE: Brand: RUNTHROUGH

## (undated) DEVICE — PACK PROCEDURE SURG VASC CATH 161 MMC LF

## (undated) DEVICE — PROCEDURE KIT FLUID MGMT 10 FR CUST MAINFOLD